# Patient Record
Sex: MALE | Race: WHITE | NOT HISPANIC OR LATINO | Employment: FULL TIME | URBAN - METROPOLITAN AREA
[De-identification: names, ages, dates, MRNs, and addresses within clinical notes are randomized per-mention and may not be internally consistent; named-entity substitution may affect disease eponyms.]

---

## 2020-11-19 ENCOUNTER — HOSPITAL ENCOUNTER (OUTPATIENT)
Facility: MEDICAL CENTER | Age: 35
End: 2020-11-19
Attending: PHYSICIAN ASSISTANT
Payer: COMMERCIAL

## 2020-11-19 ENCOUNTER — OFFICE VISIT (OUTPATIENT)
Dept: URGENT CARE | Facility: PHYSICIAN GROUP | Age: 35
End: 2020-11-19
Payer: COMMERCIAL

## 2020-11-19 ENCOUNTER — HOSPITAL ENCOUNTER (OUTPATIENT)
Dept: RADIOLOGY | Facility: MEDICAL CENTER | Age: 35
End: 2020-11-19
Attending: PHYSICIAN ASSISTANT
Payer: COMMERCIAL

## 2020-11-19 VITALS
SYSTOLIC BLOOD PRESSURE: 108 MMHG | WEIGHT: 277 LBS | HEART RATE: 71 BPM | TEMPERATURE: 99.1 F | RESPIRATION RATE: 30 BRPM | DIASTOLIC BLOOD PRESSURE: 72 MMHG | OXYGEN SATURATION: 95 %

## 2020-11-19 DIAGNOSIS — R06.82 TACHYPNEA: ICD-10-CM

## 2020-11-19 DIAGNOSIS — R19.7 DIARRHEA, UNSPECIFIED TYPE: ICD-10-CM

## 2020-11-19 DIAGNOSIS — Z20.822 SUSPECTED COVID-19 VIRUS INFECTION: ICD-10-CM

## 2020-11-19 DIAGNOSIS — R05.9 COUGH: ICD-10-CM

## 2020-11-19 DIAGNOSIS — E86.0 MILD DEHYDRATION: ICD-10-CM

## 2020-11-19 DIAGNOSIS — Z20.822 EXPOSURE TO COVID-19 VIRUS: ICD-10-CM

## 2020-11-19 DIAGNOSIS — R06.02 SHORTNESS OF BREATH: ICD-10-CM

## 2020-11-19 DIAGNOSIS — R11.0 NAUSEA: ICD-10-CM

## 2020-11-19 PROCEDURE — U0003 INFECTIOUS AGENT DETECTION BY NUCLEIC ACID (DNA OR RNA); SEVERE ACUTE RESPIRATORY SYNDROME CORONAVIRUS 2 (SARS-COV-2) (CORONAVIRUS DISEASE [COVID-19]), AMPLIFIED PROBE TECHNIQUE, MAKING USE OF HIGH THROUGHPUT TECHNOLOGIES AS DESCRIBED BY CMS-2020-01-R: HCPCS

## 2020-11-19 PROCEDURE — 99204 OFFICE O/P NEW MOD 45 MIN: CPT | Mod: CS,25 | Performed by: PHYSICIAN ASSISTANT

## 2020-11-19 PROCEDURE — 96372 THER/PROPH/DIAG INJ SC/IM: CPT | Performed by: PHYSICIAN ASSISTANT

## 2020-11-19 PROCEDURE — 71046 X-RAY EXAM CHEST 2 VIEWS: CPT

## 2020-11-19 RX ORDER — ONDANSETRON 4 MG/1
4 TABLET, ORALLY DISINTEGRATING ORAL EVERY 6 HOURS PRN
Qty: 10 TAB | Refills: 0 | Status: SHIPPED | OUTPATIENT
Start: 2020-11-19 | End: 2020-11-22

## 2020-11-19 RX ORDER — LATANOPROST 50 UG/ML
1 SOLUTION/ DROPS OPHTHALMIC NIGHTLY
COMMUNITY
End: 2022-05-06

## 2020-11-19 RX ORDER — ONDANSETRON 4 MG/1
4 TABLET, ORALLY DISINTEGRATING ORAL ONCE
Status: COMPLETED | OUTPATIENT
Start: 2020-11-19 | End: 2020-11-19

## 2020-11-19 RX ADMIN — ONDANSETRON 4 MG: 4 TABLET, ORALLY DISINTEGRATING ORAL at 13:56

## 2020-11-19 ASSESSMENT — ENCOUNTER SYMPTOMS
MYALGIAS: 1
SORE THROAT: 0
CHILLS: 1
DIARRHEA: 1
COUGH: 1
VOMITING: 0
HEADACHES: 1
SHORTNESS OF BREATH: 1
FEVER: 1
NAUSEA: 1

## 2020-11-19 NOTE — PATIENT INSTRUCTIONS
INSTRUCTIONS FOR COVID-19 OR ANY OTHER INFECTIOUS RESPIRATORY ILLNESSES    The Centers for Disease Control and Prevention (CDC) states that early indications for COVID-19 include cough, shortness of breath, difficulty breathing, or at least two of the following symptoms: chills, shaking with chills, muscle pain, headache, sore throat, and loss of taste or smell. Symptoms can range from mild to severe and may appear up to two weeks after exposure to the virus.    The practice of self-isolation and quarantine helps protect the public and your family by  preventing exposure to people who have or may have a contagious disease. Please follow the prevention steps below as based on CDC guidelines:    WHEN TO STOP ISOLATION: Persons with COVID-19 or any other infectious respiratory illness who have symptoms and were advised to care for themselves at home may discontinue home isolation under the following conditions:  · At least 24 hours have passed since recovery defined as resolution of fever without the use of fever-reducing medications; AND,  · Improvement in respiratory symptoms (e.g., cough, shortness of breath); AND,  · At least 10 days have passed since symptoms first appeared and have had no subsequent illness.    MONITOR YOUR SYMPTOMS: If your illness is worsening, seek prompt medical attention. If you have a medical emergency and need to call 911, notify the dispatch personnel that you have, or are being evaluated for confirmed or suspected COVID-19 or another infectious respiratory illness. Wear a facemask if possible.    ACTIVITY RESTRICTION: restrict activities outside your home, except for getting medical care. Do not go to work, school, or public areas. Avoid using public transportation, ride-sharing, or taxis.    SCHEDULED MEDICAL APPOINTMENTS: Notify your provider that you have, or are being evaluated for, confirmed or suspected COVID-19 or another infectious respiratory. This will help the healthcare  provider’s office safely take care of you and keep other people from getting exposed or infected.    FACEMASKS, when to wear: Anytime you are away from your home or around other people or pets. If you are unable to wear one, maintain a minimum of 6 feet distancing from others.    LIVING ENVIRONMENT: Stay in a separate room from other people and pets. If possible, use a separate bathroom, have someone else care for your pets and avoid sharing household items. Any items used should be washed thoroughly with soap and water. Clean all “high-touch” surfaces every day. Use a household cleaning spray or wipe, according to the label instructions. High touch surfaces include (but are not limited to) counters, tabletops, doorknobs, bathroom fixtures, toilets, phones, keyboards, tablets, and bedside tables.     HAND WASHING: Frequently wash hands with soap and water for at least 20 seconds,  especially after blowing your nose, coughing, or sneezing; going to the bathroom; before and after interacting with pets; and before and after eating or preparing food. If hands are visibly dirty use soap and water. If soap and water are not available, use an alcohol-based hand  with at least 60% alcohol. Avoid touching your eyes, nose, and mouth with unwashed hands. Cover your coughs and sneezes with a tissue. Throw used tissues in a lined trash can. Immediately wash your hands.    ACTIVE/FACILITATED SELF-MONITORING: Follow instructions provided by your local health department or health professionals, as appropriate. When working with your local health department check their available hours.    [facility]   Phone Number   [facility] [phone]   [facility] [place] [phone]   [place] Call [address]   [place] [phone]     IF YOU HAVE CONFIRMED POSITIVE COVID-19:    Those who have completely recovered from COVID-19 may have immune-boosting antibodies in their plasma--called “convalescent plasma”--that could be  used to treat critically ill COVID19 patients.    [facility] is excited to begin working with [facility] on collecting convalescent plasma from  people who have recovered from COVID-19 as part of a program to treat patients infected with the virus. This FDA-approved “emergency investigational new drug” is a special blood product containing antibodies that may give patients an extra boost to fight the virus.    To be eligible to donate convalescent plasma, you must have a prior COVID-19 diagnosis documented by a laboratory test (or a positive test result for SARS-CoV-2 antibodies) and meet additional eligibility requirements.    If you are interested in donating convalescent plasma or have any additional questions, please contact the [facility] Convalescent Plasma  at [phone] or via e-mail at [email or web address].

## 2020-11-19 NOTE — LETTER
November 19, 2020         Patient: Hayden Wilson   YOB: 1985   Date of Visit: 11/19/2020           To Whom it May Concern,     A concern for COVID-19 illness has been identified and testing is in progress. The results are available through our electronic delivery system called [employer].      We are asking employers to excuse absences while they follow self-isolation protocol per CDC guidelines:      • At least 10 days since symptoms first appeared and    • At least 24 hours with no fever greater than 100.4 F without fever-reducing medication and    • Symptoms have improved     If results are negative you must continue to follow the self-isolation protocol.        If the results of testing are positive then you will be contacted by your [facility] department for further instructions on duration of self-isolation and return to work.  In general, this will also follow the CDC guidelines with a minimum of 10 days from the onset of symptoms and symptoms are improving without fever.      This is the only note that will be provided from [facility] for this visit. Please schedule a visit with a primary care provider if documents for FMLA, disability, or unemployment are required.   Sincerely,?     Yuni Salgado P.A.-C.  Electronically Signed

## 2020-11-19 NOTE — LETTER
November 19, 2020         Patient: Hayden Wilson   YOB: 1985   Date of Visit: 11/19/2020       To Whom it May Concern,     A concern for COVID-19 illness has been identified and testing is in progress. The results are available through our electronic delivery system called [employer].      We are asking employers to excuse absences while they follow self-isolation protocol per CDC guidelines:      • At least 10 days since symptoms first appeared and    • At least 24 hours with no fever greater than 100.4 F without fever-reducing medication and    • Symptoms have improved     If results are negative you must continue to follow the self-isolation protocol.        If the results of testing are positive then you will be contacted by your [facility] department for further instructions on duration of self-isolation and return to work.  In general, this will also follow the CDC guidelines with a minimum of 10 days from the onset of symptoms and symptoms are improving without fever.      This is the only note that will be provided from [facility] for this visit. Please schedule a visit with a primary care provider if documents for FMLA, disability, or unemployment are required.   Sincerely,?     Yuni Salgado P.A.-C.  Electronically Signed

## 2020-11-19 NOTE — LETTER
November 19, 2020         Patient: Hayden Wilson   YOB: 1985   Date of Visit: 11/19/2020       To Whom it May Concern,     A concern for COVID-19 illness has been identified and testing is in progress. The results are available through our electronic delivery system called [employer].      We are asking employers to excuse absences while they follow self-isolation protocol per CDC guidelines:      • At least 10 days since symptoms first appeared and    • At least 24 hours with no fever greater than 100.4 F without fever-reducing medication and    • Symptoms have improved     If results are negative you must continue to follow the self-isolation protocol.        If the results of testing are positive then you will be contacted by your [facility] department for further instructions on duration of self-isolation and return to work.  In general, this will also follow the CDC guidelines with a minimum of 10 days from the onset of symptoms and symptoms are improving without fever.      This is the only note that will be provided from [facility] for this visit. Please schedule a visit with a primary care provider if documents for FMLA, disability, or unemployment are required.   Sincerely,?     Yuni Salgado P.A.-C.  Electronically Signed

## 2020-11-19 NOTE — PROGRESS NOTES
Subjective:     Hayden Wilson  is a 35 y.o. male who presents for Nausea (achy,diarrhea,headache,x 2 days,mom positive for covid 2 days ago)    This is a new problem.  Patient presents to urgent care with 2-day history of fever to 103, chills, generalized body aches, fatigue, headache, cough, mild shortness of breath.  Patient also reports nausea with no vomiting today and approximately 5 loose watery stools since this morning.  Patient denies hematochezia or melena.  Patient's been taking ibuprofen for fever which he does find somewhat helpful.  Patient's mother tested positive for COVID-19 several days ago.  Patient lives in the same household with his mother.    Patient reports that he is struggling with diarrhea and that every time he eats or drinks anything it tends to cause immediate diarrhea.  Last void, while in clinic.    Patient is a former smoker.  He denies any history of asthma or other underlying lung disease.     Nausea  Associated symptoms include chills, congestion, coughing, a fever, headaches, myalgias and nausea. Pertinent negatives include no sore throat or vomiting.         Review of Systems   Constitutional: Positive for chills, fever and malaise/fatigue.   HENT: Positive for congestion. Negative for sore throat.    Respiratory: Positive for cough and shortness of breath.    Gastrointestinal: Positive for diarrhea and nausea. Negative for vomiting.   Musculoskeletal: Positive for myalgias.   Neurological: Positive for headaches.   All other systems reviewed and are negative.    Allergies   Allergen Reactions   • Erythromycin    • Pcn [Penicillins]      Past medical history, family history, surgical history and social history are reviewed and updated in the record today.     Objective:   /72 (BP Location: Right arm, Patient Position: Sitting, BP Cuff Size: Large adult)   Pulse 71   Temp 37.3 °C (99.1 °F) (Temporal)   Resp (!) 30   Wt (!) 125.6 kg (277 lb)   SpO2 95%   Physical  Exam  Vitals signs reviewed.   Constitutional:       Appearance: He is well-developed. He is not ill-appearing or toxic-appearing.   HENT:      Head: Normocephalic and atraumatic.      Right Ear: Tympanic membrane, ear canal and external ear normal.      Left Ear: Tympanic membrane, ear canal and external ear normal.      Nose: Nose normal.      Mouth/Throat:      Lips: Pink.      Mouth: Mucous membranes are dry.      Pharynx: Uvula midline. No oropharyngeal exudate or posterior oropharyngeal erythema.   Eyes:      Conjunctiva/sclera: Conjunctivae normal.      Pupils: Pupils are equal, round, and reactive to light.   Neck:      Musculoskeletal: Normal range of motion and neck supple.   Cardiovascular:      Rate and Rhythm: Normal rate and regular rhythm.      Heart sounds: Normal heart sounds. No murmur. No friction rub.   Pulmonary:      Effort: Pulmonary effort is normal. Tachypnea present. No respiratory distress.      Breath sounds: Examination of the left-middle field reveals decreased breath sounds. Examination of the right-lower field reveals decreased breath sounds. Examination of the left-lower field reveals decreased breath sounds. Decreased breath sounds present. No wheezing, rhonchi or rales.   Abdominal:      General: Bowel sounds are normal.      Palpations: Abdomen is soft.      Tenderness: There is no abdominal tenderness.   Musculoskeletal: Normal range of motion.   Lymphadenopathy:      Head:      Right side of head: No submental, submandibular or tonsillar adenopathy.      Left side of head: No submental, submandibular or tonsillar adenopathy.      Cervical: No cervical adenopathy.      Upper Body:      Right upper body: No supraclavicular adenopathy.      Left upper body: No supraclavicular adenopathy.   Skin:     General: Skin is warm and dry.      Findings: No rash.   Neurological:      Mental Status: He is alert and oriented to person, place, and time.      Cranial Nerves: Cranial nerves are  intact. No cranial nerve deficit.      Sensory: Sensation is intact. No sensory deficit.      Motor: Motor function is intact.      Coordination: Coordination is intact. Coordination normal.      Gait: Gait is intact.   Psychiatric:         Attention and Perception: Attention normal.         Mood and Affect: Mood normal.         Speech: Speech normal.         Behavior: Behavior normal.         Thought Content: Thought content normal.         Judgment: Judgment normal.          Assessment/Plan:   1. Cough  - DX-CHEST-2 VIEWS; Future  - COVID/SARS COV-2 PCR; Future    2. Shortness of breath  - DX-CHEST-2 VIEWS; Future  - COVID/SARS COV-2 PCR; Future    3. Tachypnea  - DX-CHEST-2 VIEWS; Future  - COVID/SARS COV-2 PCR; Future    4. Suspected COVID-19 virus infection  - DX-CHEST-2 VIEWS; Future  - COVID/SARS COV-2 PCR; Future    5. Exposure to COVID-19 virus  - DX-CHEST-2 VIEWS; Future  - COVID/SARS COV-2 PCR; Future    6. Mild dehydration  - COVID/SARS COV-2 PCR; Future    7. Diarrhea, unspecified type  - COVID/SARS COV-2 PCR; Future    8. Nausea  - COVID/SARS COV-2 PCR; Future  - ondansetron (ZOFRAN ODT) dispertab 4 mg  - ondansetron (ZOFRAN ODT) 4 MG TABLET DISPERSIBLE; Take 1 Tab by mouth every 6 hours as needed for Nausea for up to 3 days.  Dispense: 10 Tab; Refill: 0      Chest x-ray is obtained, read by radiologist and reviewed by myself without acute cardiopulmonary process.      Patient has mild tachypnea on intake with respiratory rate of 24.  Reassessment of respiratory rate by provider reveals respirations of 30.  Patient is challenged with a 1 minute walk and is able to maintain O2 saturation greater than 94% for the entire time and 1 minute following activity.    Testing performed for COVID-19.  Patient/guardian is given printed instructions regarding self-isolation.  Work/school note is provided with specific return to work/school protocols.  Reviewed with patient/guardian that if they do test positive  they will be contacted by their local health department regarding return to work/school protocols.  Results will also be released to patient/guardian in [facility] and call to the patient/guardian directly.  Encouraged mask use, frequent handwashing, wiping down hard surfaces, etc.    Mucous hemorrhoids are somewhat dry here in clinic.  Patient is not tachycardic and blood pressure is acceptable.  Patient is given Zofran in clinic for nausea and additional Zofran for home.  He is counseled on the importance of oral rehydration.  Pedialyte, BRAT diet with advance as tolerated.  Signs and symptoms of dehydration reviewed.  Patient is counseled on presenting to the emergency department immediately for worsening signs of dehydration, weakness, dizziness, worsening shortness of breath, chest pain, etc.    Nasal saline rinse  Over-the-counter Flonase nasal spray per 's instructions  Mucinex as needed    May use Tylenol or ibuprofen over-the-counter as needed for pain or fever not to exceed recommended daily dose.    Upon entering exam room I ensured patient was wearing a mask.  This provider wore appropriate PPE throughout entire visit.  Patient wore mask entire visit except for a brief period while examining oropharynx.    Differential diagnosis, natural history, supportive care, and indications for immediate follow-up discussed.  Red flag warning symptoms and strict ER/follow-up precautions given.  The patient demonstrated a good understanding and agreed with the treatment plan.  Please note that this note was created using voice recognition speech to text software. Every effort has been made to correct obvious errors.  However, I expect there are errors of grammar and possibly context that were not discovered prior to finalizing the note  FRANCISCO Salgado PA-C

## 2020-11-20 DIAGNOSIS — R06.02 SHORTNESS OF BREATH: ICD-10-CM

## 2020-11-20 DIAGNOSIS — R05.9 COUGH: ICD-10-CM

## 2020-11-20 DIAGNOSIS — R06.82 TACHYPNEA: ICD-10-CM

## 2020-11-20 DIAGNOSIS — E86.0 MILD DEHYDRATION: ICD-10-CM

## 2020-11-20 DIAGNOSIS — Z20.822 SUSPECTED COVID-19 VIRUS INFECTION: ICD-10-CM

## 2020-11-20 DIAGNOSIS — R19.7 DIARRHEA, UNSPECIFIED TYPE: ICD-10-CM

## 2020-11-20 DIAGNOSIS — R11.0 NAUSEA: ICD-10-CM

## 2020-11-20 DIAGNOSIS — Z20.822 EXPOSURE TO COVID-19 VIRUS: ICD-10-CM

## 2020-11-20 LAB
COVID ORDER STATUS COVID19: NORMAL
SARS-COV-2 RNA RESP QL NAA+PROBE: DETECTED
SPECIMEN SOURCE: ABNORMAL

## 2021-07-18 ENCOUNTER — OFFICE VISIT (OUTPATIENT)
Dept: URGENT CARE | Facility: PHYSICIAN GROUP | Age: 36
End: 2021-07-18

## 2021-07-18 ENCOUNTER — HOSPITAL ENCOUNTER (OUTPATIENT)
Dept: RADIOLOGY | Facility: MEDICAL CENTER | Age: 36
End: 2021-07-18
Attending: NURSE PRACTITIONER

## 2021-07-18 VITALS
OXYGEN SATURATION: 98 % | BODY MASS INDEX: 41.09 KG/M2 | HEIGHT: 70 IN | HEART RATE: 79 BPM | SYSTOLIC BLOOD PRESSURE: 118 MMHG | RESPIRATION RATE: 20 BRPM | DIASTOLIC BLOOD PRESSURE: 84 MMHG | WEIGHT: 287 LBS | TEMPERATURE: 98.1 F

## 2021-07-18 DIAGNOSIS — R19.03 RIGHT LOWER QUADRANT ABDOMINAL SWELLING, MASS AND LUMP: ICD-10-CM

## 2021-07-18 DIAGNOSIS — Z00.00 HEALTH CARE MAINTENANCE: ICD-10-CM

## 2021-07-18 PROCEDURE — 99213 OFFICE O/P EST LOW 20 MIN: CPT | Performed by: NURSE PRACTITIONER

## 2021-07-18 PROCEDURE — 74018 RADEX ABDOMEN 1 VIEW: CPT

## 2021-07-18 ASSESSMENT — ENCOUNTER SYMPTOMS
CONSTIPATION: 0
SENSORY CHANGE: 1
CHILLS: 0
TINGLING: 1
VOMITING: 0
NAUSEA: 0
DIARRHEA: 0
ABDOMINAL PAIN: 0
FEVER: 0
MYALGIAS: 0

## 2021-07-18 NOTE — PROGRESS NOTES
Subjective:     Hayden Wilson is a 36 y.o. male who presents for Bump (right side on hip x 3 days numbness and tingling on hands and feet)      HPI  Pt presents for evaluation of a new problem.  Hayden is a pleasant 36-year-old male presents to urgent care today with complaints of right sided abdominal swelling x1 day.  He denies any pain associated with the swelling.  He is negative for constipation, fever, chills, nausea or vomiting.  2 days ago he did have one episode of vomiting followed by an episode of diarrhea.  This did resolve without intervention.  This morning, he developed right hand numbness/tingling as well as right foot numbness/tingling.  He does suffer from chronic lower back pain on the right side.  He denies any new or worsening back pain.     Review of Systems   Constitutional: Negative for chills, fever and malaise/fatigue.   Gastrointestinal: Negative for abdominal pain, constipation, diarrhea, nausea and vomiting.   Genitourinary: Negative for dysuria, frequency and urgency.   Musculoskeletal: Negative for myalgias.   Neurological: Positive for tingling and sensory change.       PMH: No past medical history on file.  ALLERGIES:   Allergies   Allergen Reactions   • Erythromycin    • Pcn [Penicillins]      SURGHX: No past surgical history on file.  SOCHX:   Social History     Socioeconomic History   • Marital status: Single     Spouse name: Not on file   • Number of children: Not on file   • Years of education: Not on file   • Highest education level: Not on file   Occupational History   • Not on file   Tobacco Use   • Smoking status: Former Smoker   • Smokeless tobacco: Never Used   Vaping Use   • Vaping Use: Former   Substance and Sexual Activity   • Alcohol use: Not on file   • Drug use: Not on file   • Sexual activity: Not on file   Other Topics Concern   • Not on file   Social History Narrative   • Not on file     Social Determinants of Health     Financial Resource Strain:    • Difficulty  "of Paying Living Expenses:    Food Insecurity:    • Worried About Running Out of Food in the Last Year:    • Ran Out of Food in the Last Year:    Transportation Needs:    • Lack of Transportation (Medical):    • Lack of Transportation (Non-Medical):    Physical Activity:    • Days of Exercise per Week:    • Minutes of Exercise per Session:    Stress:    • Feeling of Stress :    Social Connections:    • Frequency of Communication with Friends and Family:    • Frequency of Social Gatherings with Friends and Family:    • Attends Baptism Services:    • Active Member of Clubs or Organizations:    • Attends Club or Organization Meetings:    • Marital Status:    Intimate Partner Violence:    • Fear of Current or Ex-Partner:    • Emotionally Abused:    • Physically Abused:    • Sexually Abused:      FH:   Family History   Problem Relation Age of Onset   • Hyperlipidemia Mother    • Hypertension Father          Objective:   /84 (BP Location: Left arm, Patient Position: Sitting, BP Cuff Size: Adult)   Pulse 79   Temp 36.7 °C (98.1 °F) (Temporal)   Resp 20   Ht 1.765 m (5' 9.5\")   Wt (!) 130 kg (287 lb)   SpO2 98%   BMI 41.77 kg/m²     Physical Exam  Vitals and nursing note reviewed.   Constitutional:       General: He is not in acute distress.     Appearance: Normal appearance. He is not ill-appearing.   HENT:      Head: Normocephalic and atraumatic.      Right Ear: External ear normal.      Left Ear: External ear normal.      Nose: No congestion or rhinorrhea.      Mouth/Throat:      Mouth: Mucous membranes are moist.   Eyes:      Extraocular Movements: Extraocular movements intact.      Pupils: Pupils are equal, round, and reactive to light.   Cardiovascular:      Rate and Rhythm: Normal rate and regular rhythm.      Pulses: Normal pulses.      Heart sounds: Normal heart sounds.   Pulmonary:      Effort: Pulmonary effort is normal.      Breath sounds: Normal breath sounds.   Abdominal:      General: Abdomen " is flat. Bowel sounds are normal. There is distension.      Palpations: Abdomen is soft. There is no mass.      Tenderness: There is no abdominal tenderness. There is no right CVA tenderness, left CVA tenderness, guarding or rebound.      Hernia: No hernia is present.          Comments: There is visible swelling of his right side of abdomen from anterior and posterior view.  Negative for erythema, mass or pain with palpation.   Musculoskeletal:         General: Normal range of motion.      Cervical back: Normal range of motion and neck supple.   Skin:     General: Skin is warm and dry.      Capillary Refill: Capillary refill takes less than 2 seconds.   Neurological:      General: No focal deficit present.      Mental Status: He is alert and oriented to person, place, and time. Mental status is at baseline.   Psychiatric:         Mood and Affect: Mood normal.         Behavior: Behavior normal.         Thought Content: Thought content normal.         Judgment: Judgment normal.       DX abdomen:FINDINGS:  There is no evidence of bowel obstruction.  There is no evidence of free intraperitoneal air.  There are pelvic phleboliths.     IMPRESSION:     No evidence of bowel obstruction.  Assessment/Plan:   Assessment    1. Right lower quadrant abdominal swelling, mass and lump  [id number] VIEW   2. [facility] care maintenance  AMB REFERRAL TO ESTABLISH WITH [facility]     We discussed differential diagnoses.  X-ray was reviewed by myself and I do agree with radiologist interpretation.  Patient notified of x-ray findings.  He is currently uninsured and does not wish to have further diagnostic tests.  He is in no apparent distress or pain at visit today.  He is in the process of getting insurance so he was referred to follow-up with primary care.  Red flags discussed when he should seek care back in urgent care or ER.  He verbalized understanding.

## 2021-07-21 ENCOUNTER — TELEPHONE (OUTPATIENT)
Dept: SCHEDULING | Facility: IMAGING CENTER | Age: 36
End: 2021-07-21

## 2021-09-08 ENCOUNTER — HOSPITAL ENCOUNTER (OUTPATIENT)
Dept: LAB | Facility: MEDICAL CENTER | Age: 36
End: 2021-09-08
Attending: PHYSICIAN ASSISTANT
Payer: COMMERCIAL

## 2021-09-08 ENCOUNTER — OFFICE VISIT (OUTPATIENT)
Dept: MEDICAL GROUP | Facility: PHYSICIAN GROUP | Age: 36
End: 2021-09-08
Payer: COMMERCIAL

## 2021-09-08 VITALS
RESPIRATION RATE: 14 BRPM | HEART RATE: 65 BPM | SYSTOLIC BLOOD PRESSURE: 118 MMHG | DIASTOLIC BLOOD PRESSURE: 78 MMHG | HEIGHT: 70 IN | WEIGHT: 291 LBS | OXYGEN SATURATION: 99 % | BODY MASS INDEX: 41.66 KG/M2 | TEMPERATURE: 97.2 F

## 2021-09-08 DIAGNOSIS — Z13.29 SCREENING FOR ENDOCRINE DISORDER: ICD-10-CM

## 2021-09-08 DIAGNOSIS — H40.9 GLAUCOMA, UNSPECIFIED GLAUCOMA TYPE, UNSPECIFIED LATERALITY: ICD-10-CM

## 2021-09-08 DIAGNOSIS — R10.31 RIGHT LOWER QUADRANT ABDOMINAL PAIN: ICD-10-CM

## 2021-09-08 DIAGNOSIS — Z00.00 GENERAL MEDICAL EXAM: ICD-10-CM

## 2021-09-08 DIAGNOSIS — M54.50 CHRONIC RIGHT-SIDED LOW BACK PAIN WITHOUT SCIATICA: ICD-10-CM

## 2021-09-08 DIAGNOSIS — G89.29 CHRONIC RIGHT-SIDED LOW BACK PAIN WITHOUT SCIATICA: ICD-10-CM

## 2021-09-08 PROBLEM — R10.9 ABDOMINAL PAIN: Status: ACTIVE | Noted: 2021-09-08

## 2021-09-08 LAB
25(OH)D3 SERPL-MCNC: 21 NG/ML (ref 30–100)
ALBUMIN SERPL BCP-MCNC: 4.3 G/DL (ref 3.2–4.9)
ALBUMIN/GLOB SERPL: 1.4 G/DL
ALP SERPL-CCNC: 70 U/L (ref 30–99)
ALT SERPL-CCNC: 32 U/L (ref 2–50)
ANION GAP SERPL CALC-SCNC: 10 MMOL/L (ref 7–16)
APPEARANCE UR: CLEAR
AST SERPL-CCNC: 35 U/L (ref 12–45)
BASOPHILS # BLD AUTO: 0.5 % (ref 0–1.8)
BASOPHILS # BLD: 0.03 K/UL (ref 0–0.12)
BILIRUB SERPL-MCNC: 0.6 MG/DL (ref 0.1–1.5)
BILIRUB UR QL STRIP.AUTO: NEGATIVE
BUN SERPL-MCNC: 13 MG/DL (ref 8–22)
CALCIUM SERPL-MCNC: 9.2 MG/DL (ref 8.5–10.5)
CHLORIDE SERPL-SCNC: 105 MMOL/L (ref 96–112)
CHOLEST SERPL-MCNC: 181 MG/DL (ref 100–199)
CO2 SERPL-SCNC: 26 MMOL/L (ref 20–33)
COLOR UR: YELLOW
CREAT SERPL-MCNC: 0.82 MG/DL (ref 0.5–1.4)
EOSINOPHIL # BLD AUTO: 0.1 K/UL (ref 0–0.51)
EOSINOPHIL NFR BLD: 1.7 % (ref 0–6.9)
ERYTHROCYTE [DISTWIDTH] IN BLOOD BY AUTOMATED COUNT: 48.4 FL (ref 35.9–50)
FASTING STATUS PATIENT QL REPORTED: NORMAL
GLOBULIN SER CALC-MCNC: 3 G/DL (ref 1.9–3.5)
GLUCOSE SERPL-MCNC: 90 MG/DL (ref 65–99)
GLUCOSE UR STRIP.AUTO-MCNC: NEGATIVE MG/DL
HCT VFR BLD AUTO: 44.7 % (ref 42–52)
HDLC SERPL-MCNC: 35 MG/DL
HGB BLD-MCNC: 15 G/DL (ref 14–18)
IMM GRANULOCYTES # BLD AUTO: 0.03 K/UL (ref 0–0.11)
IMM GRANULOCYTES NFR BLD AUTO: 0.5 % (ref 0–0.9)
KETONES UR STRIP.AUTO-MCNC: NEGATIVE MG/DL
LDLC SERPL CALC-MCNC: 128 MG/DL
LEUKOCYTE ESTERASE UR QL STRIP.AUTO: NEGATIVE
LYMPHOCYTES # BLD AUTO: 1.83 K/UL (ref 1–4.8)
LYMPHOCYTES NFR BLD: 30.6 % (ref 22–41)
MCH RBC QN AUTO: 30.7 PG (ref 27–33)
MCHC RBC AUTO-ENTMCNC: 33.6 G/DL (ref 33.7–35.3)
MCV RBC AUTO: 91.6 FL (ref 81.4–97.8)
MICRO URNS: NORMAL
MONOCYTES # BLD AUTO: 0.59 K/UL (ref 0–0.85)
MONOCYTES NFR BLD AUTO: 9.8 % (ref 0–13.4)
NEUTROPHILS # BLD AUTO: 3.41 K/UL (ref 1.82–7.42)
NEUTROPHILS NFR BLD: 56.9 % (ref 44–72)
NITRITE UR QL STRIP.AUTO: NEGATIVE
NRBC # BLD AUTO: 0 K/UL
NRBC BLD-RTO: 0 /100 WBC
PH UR STRIP.AUTO: 6 [PH] (ref 5–8)
PLATELET # BLD AUTO: 336 K/UL (ref 164–446)
PMV BLD AUTO: 9 FL (ref 9–12.9)
POTASSIUM SERPL-SCNC: 3.9 MMOL/L (ref 3.6–5.5)
PROT SERPL-MCNC: 7.3 G/DL (ref 6–8.2)
PROT UR QL STRIP: NEGATIVE MG/DL
RBC # BLD AUTO: 4.88 M/UL (ref 4.7–6.1)
RBC UR QL AUTO: NEGATIVE
SODIUM SERPL-SCNC: 141 MMOL/L (ref 135–145)
SP GR UR STRIP.AUTO: 1.02
TRIGL SERPL-MCNC: 89 MG/DL (ref 0–149)
TSH SERPL DL<=0.005 MIU/L-ACNC: 0.65 UIU/ML (ref 0.38–5.33)
UROBILINOGEN UR STRIP.AUTO-MCNC: 0.2 MG/DL
WBC # BLD AUTO: 6 K/UL (ref 4.8–10.8)

## 2021-09-08 PROCEDURE — 99213 OFFICE O/P EST LOW 20 MIN: CPT | Performed by: PHYSICIAN ASSISTANT

## 2021-09-08 PROCEDURE — 84443 ASSAY THYROID STIM HORMONE: CPT

## 2021-09-08 PROCEDURE — 81003 URINALYSIS AUTO W/O SCOPE: CPT

## 2021-09-08 PROCEDURE — 85025 COMPLETE CBC W/AUTO DIFF WBC: CPT

## 2021-09-08 PROCEDURE — 82306 VITAMIN D 25 HYDROXY: CPT

## 2021-09-08 PROCEDURE — 80053 COMPREHEN METABOLIC PANEL: CPT

## 2021-09-08 PROCEDURE — 36415 COLL VENOUS BLD VENIPUNCTURE: CPT

## 2021-09-08 PROCEDURE — 80061 LIPID PANEL: CPT

## 2021-09-08 ASSESSMENT — PATIENT HEALTH QUESTIONNAIRE - PHQ9: CLINICAL INTERPRETATION OF PHQ2 SCORE: 0

## 2021-09-08 NOTE — ASSESSMENT & PLAN NOTE
Right flank/abdominal pain. Has been this way since July 18th. Went to - Abdominal xray was negative. The day before onset he was Nauseous with vomiting and chills. No recurrent symptoms since. No fever or chills. No urinary symptoms. No hematuria, no urinary urgency or frequency. No constipation or diarrhea.   Aggravated with strain. States will swell at times.   Patient takes 6 Advil daily x 2 months for sore joints after work.

## 2021-09-08 NOTE — PROGRESS NOTES
CC:   Chief Complaint   Patient presents with   • Establish Care   • Abdominal Pain          HISTORY OF PRESENT ILLNESS: Patient is a 36 y.o. male established patient who presents today to establish care with me and discuss the following issues:      Health Maintenance: Completed    Abdominal pain  Right flank/abdominal pain. Has been this way since . Went to - Abdominal xray was negative. The day before onset he was Nauseous with vomiting and chills. No recurrent symptoms since. No fever or chills. No urinary symptoms. No hematuria, no urinary urgency or frequency. No constipation or diarrhea.   Aggravated with strain. States will swell at times.   Patient takes 6 Advil daily x 2 months for sore joints after work.     Glaucoma  Dr. Chavira. On latanoprost eye drops. Was diagnosed 2 years ago.       Patient Active Problem List    Diagnosis Date Noted   • Abdominal pain 2021   • Glaucoma 2021      Allergies:Erythromycin and Pcn [penicillins]    Current Outpatient Medications   Medication Sig Dispense Refill   • latanoprost (XALATAN) 0.005 % Solution Administer 1 Drop into both eyes every evening.       No current facility-administered medications for this visit.       Social History     Tobacco Use   • Smoking status: Former Smoker     Types: Cigarettes     Quit date:      Years since quittin.6   • Smokeless tobacco: Never Used   Vaping Use   • Vaping Use: Former   Substance Use Topics   • Alcohol use: Not Currently   • Drug use: Never     Social History     Social History Narrative   • Not on file       Family History   Problem Relation Age of Onset   • Hyperlipidemia Mother    • Hypertension Father    • Diabetes Father        Review of Systems:    Constitutional: No Fevers, Chills  Eyes: No vision changes  ENT: No hearing changes  Resp: No Shortness of breath  CV: No Chest pain  GI: No Nausea/Vomiting  MSK: No weakness  Skin: No rashes  Neuro: No Headaches  Psych: No Suicidal  "ideations    All remaining systems reviewed and found to be negative, except as stated above.    Exam:    /78   Pulse 65   Temp 36.2 °C (97.2 °F) (Temporal)   Resp 14   Ht 1.765 m (5' 9.5\")   Wt (!) 132 kg (291 lb)   SpO2 99%  Body mass index is 42.36 kg/m².    General:  Well nourished, well developed male in NAD  HENT: Atraumatic, normocephalic  EYES: Extraocular movements intact  NECK: Supple, FROM  CHEST: No deformities, Equal chest expansion  RESP: Unlabored, no stridor or audible wheeze  HEART: Regular Rate and rhythm.   ABD: Soft, Nontender, Non-Distended  Right lower  to palpation just above ilium. No bulge palpated. No CVA tenderness bilaterally. No hip tenderness. Normal gait. Lumbar spine nontender midline and paraspinous region.   Extremities: No Clubbing, Cyanosis, or Edema  Skin: Warm/dry, without rashes  Neuro: A/O x 4, due to COVID-19- did not have patient remove face mask to test cranial nerves.  Motor/sensory grossly intact  Psych: Normal behavior, normal affect      Assessment/Plan:  1. Right lower quadrant abdominal pain  - URINALYSIS,CULTURE IF INDICATED; Future  2. Chronic right-sided low back pain without sciatica  - REFERRAL TO PHYSICAL THERAPY  Suspect musculoskeletal in nature.  Possible muscular strain over the right lower back.  He does have a new exertional job that he has been out for about 3 months.  Going to proceed with baseline labs, hold on imaging.  I do believe he benefit from PT at this time.  3. General medical exam  - CBC WITH DIFFERENTIAL; Future  - Comp Metabolic Panel; Future  - Lipid Profile; Future  - TSH; Future    4. Screening for endocrine disorder  - VITAMIN D,25 HYDROXY; Future    5. Glaucoma, unspecified glaucoma type, unspecified laterality  Follow-up with eye specialist as scheduled.    Follow-up: Return in about 4 weeks (around 10/6/2021) for Follow up on labs.    Please note that this dictation was created using voice recognition software. " I have made every reasonable attempt to correct obvious errors, but I expect that there are errors of grammar and possibly content that I did not discover before finalizing the note.

## 2021-09-09 ENCOUNTER — TELEPHONE (OUTPATIENT)
Dept: MEDICAL GROUP | Facility: PHYSICIAN GROUP | Age: 36
End: 2021-09-09

## 2021-09-09 NOTE — TELEPHONE ENCOUNTER
----- Message from Janis Fall P.A.-C. sent at 9/8/2021  3:31 PM PDT -----  Please call patient about their results.     Results showed:     Your cholesterol is elevated. I recommend decreasing your intake of saturated fats which are found in meats that come from a cow or pig. Saturated fats are also found in creams, cheeses, butter, mayonnaise, and fried foods. I recommend that you try to eat more vegetables, fruits, fish, and healthy oils like olive oil. Increase fiber intake to 35 grams or more a day. If you do not eat a lot of fiber currently, increase fiber slowly over weeks to reduce bloating and abdominal discomfort. I also recommend moderate intensity exercise like a brisk walk. This exercise should last at least 30 minutes and occur 5 or more days per week.       Vitamin D is low.  Please start supplementing vitamin D 2000 IUs daily.    Thank you,    Janis Fall PA-C

## 2021-10-07 ENCOUNTER — TELEPHONE (OUTPATIENT)
Dept: MEDICAL GROUP | Facility: PHYSICIAN GROUP | Age: 36
End: 2021-10-07

## 2021-10-07 ENCOUNTER — OFFICE VISIT (OUTPATIENT)
Dept: MEDICAL GROUP | Facility: PHYSICIAN GROUP | Age: 36
End: 2021-10-07
Payer: COMMERCIAL

## 2021-10-07 ENCOUNTER — HOSPITAL ENCOUNTER (OUTPATIENT)
Dept: RADIOLOGY | Facility: MEDICAL CENTER | Age: 36
End: 2021-10-07
Attending: PHYSICIAN ASSISTANT
Payer: COMMERCIAL

## 2021-10-07 VITALS
HEART RATE: 78 BPM | TEMPERATURE: 98.9 F | RESPIRATION RATE: 20 BRPM | OXYGEN SATURATION: 98 % | DIASTOLIC BLOOD PRESSURE: 82 MMHG | WEIGHT: 295 LBS | SYSTOLIC BLOOD PRESSURE: 130 MMHG | BODY MASS INDEX: 43.69 KG/M2 | HEIGHT: 69 IN

## 2021-10-07 DIAGNOSIS — G89.29 CHRONIC RIGHT SHOULDER PAIN: ICD-10-CM

## 2021-10-07 DIAGNOSIS — M25.511 CHRONIC RIGHT SHOULDER PAIN: ICD-10-CM

## 2021-10-07 DIAGNOSIS — E55.9 VITAMIN D DEFICIENCY: ICD-10-CM

## 2021-10-07 DIAGNOSIS — E78.5 DYSLIPIDEMIA: ICD-10-CM

## 2021-10-07 PROCEDURE — 73030 X-RAY EXAM OF SHOULDER: CPT | Mod: RT

## 2021-10-07 PROCEDURE — 99214 OFFICE O/P EST MOD 30 MIN: CPT | Performed by: PHYSICIAN ASSISTANT

## 2021-10-07 ASSESSMENT — FIBROSIS 4 INDEX: FIB4 SCORE: 0.7

## 2021-10-07 NOTE — ASSESSMENT & PLAN NOTE
This is a chronic condition.   Latest Labs:   Lab Results   Component Value Date/Time    CHOLSTRLTOT 181 09/08/2021 10:30 AM     (H) 09/08/2021 10:30 AM    HDL 35 (A) 09/08/2021 10:30 AM    TRIGLYCERIDE 89 09/08/2021 10:30 AM    Discussed lifestyle modifications. At work he is very active. He could use improvement with diet.

## 2021-10-07 NOTE — ASSESSMENT & PLAN NOTE
"Injury as a child- threw a \"shot-put like a baseball\".   Saw PT, they evaluated him for this.   At work lifting more than usual, this aggravates it. Taking Advil for it, helps a little bit. Some radiation down arm. No paresthesias. If used too much some arm weakness.   "

## 2021-10-07 NOTE — PROGRESS NOTES
"CC:   Chief Complaint   Patient presents with   • Lab Results     follow up    • Shoulder Pain     right 1 month          HISTORY OF PRESENT ILLNESS: Patient is a 36 y.o. male established patient who presents today to follow up on the following conditions:       Health Maintenance: Completed    Vitamin D deficiency  This is a  chronic condition.   Supplementation: recommend 2000 IU daily  Last Vitamin D level:   VIT D:   Lab Results   Component Value Date/Time    25HYDROXY 21 (L) 2021 1030     Patient denies any muscle aches or fatigue.       Dyslipidemia  This is a chronic condition.   Latest Labs:   Lab Results   Component Value Date/Time    CHOLSTRLTOT 181 2021 10:30 AM     (H) 2021 10:30 AM    HDL 35 (A) 2021 10:30 AM    TRIGLYCERIDE 89 2021 10:30 AM    Discussed lifestyle modifications. At work he is very active. He could use improvement with diet.     Chronic right shoulder pain  Injury as a child- threw a \"shot-put like a baseball\".   Saw PT, they evaluated him for this.   At work lifting more than usual, this aggravates it. Taking Advil for it, helps a little bit. Some radiation down arm. No paresthesias. If used too much some arm weakness.       Patient Active Problem List    Diagnosis Date Noted   • Vitamin D deficiency 10/07/2021   • Dyslipidemia 10/07/2021   • Chronic right shoulder pain 10/07/2021   • Abdominal pain 2021   • Glaucoma 2021      Allergies:Erythromycin and Pcn [penicillins]    Current Outpatient Medications   Medication Sig Dispense Refill   • latanoprost (XALATAN) 0.005 % Solution Administer 1 Drop into both eyes every evening.       No current facility-administered medications for this visit.       Social History     Tobacco Use   • Smoking status: Former Smoker     Types: Cigarettes     Quit date: 2013     Years since quittin.7   • Smokeless tobacco: Never Used   Vaping Use   • Vaping Use: Former   Substance Use Topics   • Alcohol " "use: Not Currently   • Drug use: Never     Social History     Social History Narrative   • Not on file       Family History   Problem Relation Age of Onset   • Hyperlipidemia Mother    • Hypertension Father    • Diabetes Father        Review of Systems:    Constitutional: No Fevers, Chills  Eyes: No vision changes  ENT: No hearing changes  Resp: No Shortness of breath  CV: No Chest pain  GI: No Nausea/Vomiting  MSK: No weakness  Skin: No rashes  Neuro: No Headaches  Psych: No Suicidal ideations    All remaining systems reviewed and found to be negative, except as stated above.    Exam:    /82 (BP Location: Right arm, Patient Position: Sitting, BP Cuff Size: Large adult)   Pulse 78   Temp 37.2 °C (98.9 °F) (Temporal)   Resp 20   Ht 1.753 m (5' 9\")   Wt (!) 134 kg (295 lb)   SpO2 98%  Body mass index is 43.56 kg/m².    General:  Well nourished, well developed male in NAD  HENT: Atraumatic, normocephalic  EYES: Extraocular movements intact  NECK: Supple, FROM  CHEST: No deformities, Equal chest expansion  RESP: Unlabored, no stridor or audible wheeze  HEART: Regular Rate and rhythm.   Extremities: No Clubbing, Cyanosis, or Edema  Right shoulder: Essentially nontender to palpation.  Reproducible pain over the anterior superior aspect with straight arm raise.  Full range of motion.  No obvious bony deformity or edema.  Skin: Warm/dry, without rashes  Neuro: A/O x 4, due to COVID-19- did not have patient remove face mask to test cranial nerves.  Motor/sensory grossly intact  Psych: Normal behavior, normal affect      Lab review:  Labs are reviewed and discussed with a patient  Lab Results   Component Value Date/Time    CHOLSTRLTOT 181 09/08/2021 10:30 AM     (H) 09/08/2021 10:30 AM    HDL 35 (A) 09/08/2021 10:30 AM    TRIGLYCERIDE 89 09/08/2021 10:30 AM       Lab Results   Component Value Date/Time    SODIUM 141 09/08/2021 10:30 AM    POTASSIUM 3.9 09/08/2021 10:30 AM    CHLORIDE 105 09/08/2021 10:30 AM "    CO2 26 09/08/2021 10:30 AM    GLUCOSE 90 09/08/2021 10:30 AM    BUN 13 09/08/2021 10:30 AM    CREATININE 0.82 09/08/2021 10:30 AM     Lab Results   Component Value Date/Time    ALKPHOSPHAT 70 09/08/2021 10:30 AM    ASTSGOT 35 09/08/2021 10:30 AM    ALTSGPT 32 09/08/2021 10:30 AM    TBILIRUBIN 0.6 09/08/2021 10:30 AM      No results found for: HBA1C  No results found for: TSH  No results found for: FREET4    Lab Results   Component Value Date/Time    WBC 6.0 09/08/2021 10:30 AM    RBC 4.88 09/08/2021 10:30 AM    HEMOGLOBIN 15.0 09/08/2021 10:30 AM    HEMATOCRIT 44.7 09/08/2021 10:30 AM    MCV 91.6 09/08/2021 10:30 AM    MCH 30.7 09/08/2021 10:30 AM    MCHC 33.6 (L) 09/08/2021 10:30 AM    MPV 9.0 09/08/2021 10:30 AM    NEUTSPOLYS 56.90 09/08/2021 10:30 AM    LYMPHOCYTES 30.60 09/08/2021 10:30 AM    MONOCYTES 9.80 09/08/2021 10:30 AM    EOSINOPHILS 1.70 09/08/2021 10:30 AM    BASOPHILS 0.50 09/08/2021 10:30 AM          Assessment/Plan:  1. Vitamin D deficiency  Continue vitamin D supplementation at least 2000 IUs a day.  2. Dyslipidemia  - Lipid Profile; Future  Patient will work on lifestyle modifications, repeat labs in 90 days.  3. Chronic right shoulder pain  - DX-SHOULDER 2+ RIGHT; Future  - REFERRAL TO ORTHOPEDICS  Suspect possible bursitis versus tendinitis of the supraspinatus.  We will proceed with getting x-ray to start.  Referring to Dr. Magallanes as well.  May benefit from injection.    Follow-up: Return in about 3 months (around 1/7/2022) for Follow up on labs.    Please note that this dictation was created using voice recognition software. I have made every reasonable attempt to correct obvious errors, but I expect that there are errors of grammar and possibly content that I did not discover before finalizing the note.

## 2021-10-07 NOTE — ASSESSMENT & PLAN NOTE
This is a  chronic condition.   Supplementation: recommend 2000 IU daily  Last Vitamin D level:   VIT D:   Lab Results   Component Value Date/Time    25HYDROXY 21 (L) 09/08/2021 1030     Patient denies any muscle aches or fatigue.

## 2021-10-07 NOTE — TELEPHONE ENCOUNTER
----- Message from Janis Fall P.A.-C. sent at 10/7/2021  1:04 PM PDT -----  Please call patient about their results.     Results showed:     1.  No acute findings.     2.  Focal calcification at the rotator cuff insertion on the greater tuberosity. Findings are consistent with calcific tendinitis.    Recommend proceed with plan, refer to ortho.     Thank you,    Janis Fall PA-C

## 2021-10-21 ENCOUNTER — OFFICE VISIT (OUTPATIENT)
Dept: MEDICAL GROUP | Facility: PHYSICIAN GROUP | Age: 36
End: 2021-10-21
Payer: COMMERCIAL

## 2021-10-21 VITALS
TEMPERATURE: 99.5 F | HEIGHT: 69 IN | SYSTOLIC BLOOD PRESSURE: 138 MMHG | DIASTOLIC BLOOD PRESSURE: 82 MMHG | BODY MASS INDEX: 44.02 KG/M2 | HEART RATE: 84 BPM | OXYGEN SATURATION: 96 % | RESPIRATION RATE: 16 BRPM | WEIGHT: 297.2 LBS

## 2021-10-21 DIAGNOSIS — G89.29 CHRONIC RIGHT SHOULDER PAIN: ICD-10-CM

## 2021-10-21 DIAGNOSIS — M25.511 CHRONIC RIGHT SHOULDER PAIN: ICD-10-CM

## 2021-10-21 PROCEDURE — 99213 OFFICE O/P EST LOW 20 MIN: CPT | Performed by: PHYSICIAN ASSISTANT

## 2021-10-21 RX ORDER — CLINDAMYCIN HYDROCHLORIDE 300 MG/1
CAPSULE ORAL
COMMUNITY
Start: 2021-10-18 | End: 2021-10-28

## 2021-10-21 ASSESSMENT — FIBROSIS 4 INDEX: FIB4 SCORE: 0.7

## 2021-10-21 NOTE — ASSESSMENT & PLAN NOTE
Patient continues to have difficulty with his shoulder, a bit better since he's been off work. The last day he worked he reached overhead and it greatly exacerbated the pain. His last day of work was 10/11/21. Cannot get in to see Dr. Altman until December 27th. Discussed referral to another to get him in sooner. Patient tried to ask for light duty or extra day off work, but unable to get these approved.

## 2021-10-21 NOTE — PROGRESS NOTES
CC:   Chief Complaint   Patient presents with   • Paperwork   • Shoulder Pain          HISTORY OF PRESENT ILLNESS: Patient is a 36 y.o. male established patient who presents today to follow up on the following conditions:       Health Maintenance: Completed    Chronic right shoulder pain  Patient continues to have difficulty with his shoulder, a bit better since he's been off work. The last day he worked he reached overhead and it greatly exacerbated the pain. His last day of work was 10/11/21. Cannot get in to see Dr. Altman until . Discussed referral to another to get him in sooner. Patient tried to ask for light duty or extra day off work, but unable to get these approved.       Patient Active Problem List    Diagnosis Date Noted   • Vitamin D deficiency 10/07/2021   • Dyslipidemia 10/07/2021   • Chronic right shoulder pain 10/07/2021   • Abdominal pain 2021   • Glaucoma 2021      Allergies:Erythromycin and Pcn [penicillins]    Current Outpatient Medications   Medication Sig Dispense Refill   • clindamycin (CLEOCIN) 300 MG Cap      • latanoprost (XALATAN) 0.005 % Solution Administer 1 Drop into both eyes every evening.       No current facility-administered medications for this visit.       Social History     Tobacco Use   • Smoking status: Former Smoker     Types: Cigarettes     Quit date:      Years since quittin.8   • Smokeless tobacco: Never Used   Vaping Use   • Vaping Use: Former   Substance Use Topics   • Alcohol use: Not Currently   • Drug use: Never     Social History     Social History Narrative   • Not on file       Family History   Problem Relation Age of Onset   • Hyperlipidemia Mother    • Hypertension Father    • Diabetes Father        Review of Systems:    Constitutional: No Fevers, Chills  Eyes: No vision changes  ENT: No hearing changes  Resp: No Shortness of breath  CV: No Chest pain  GI: No Nausea/Vomiting  MSK: No weakness  Skin: No rashes  Neuro: No  "Headaches  Psych: No Suicidal ideations    All remaining systems reviewed and found to be negative, except as stated above.    Exam:    /82   Pulse 84   Temp 37.5 °C (99.5 °F) (Temporal)   Resp 16   Ht 1.753 m (5' 9\")   Wt (!) 135 kg (297 lb 3.2 oz)   SpO2 96%  Body mass index is 43.89 kg/m².    General:  Well nourished, well developed male in NAD  HENT: Atraumatic, normocephalic  EYES: Extraocular movements intact  NECK: Supple, FROM  CHEST: No deformities, Equal chest expansion  RESP: Unlabored, no stridor or audible wheeze  HEART: Regular Rate and rhythm.   Extremities: No Clubbing, Cyanosis, or Edema  Right shoulder, mild tenderness palpation over the distal AC joint.  No obvious bony deformities.  Reproducible pain with abduction greater than 90 degrees.  Reproducible pain with resistance against flexion  Skin: Warm/dry, without rashes  Neuro: A/O x 4, due to COVID-19- did not have patient remove face mask to test cranial nerves.  Motor/sensory grossly intact  Psych: Normal behavior, normal affect      Lab review:  Labs are reviewed and discussed with a patient  Lab Results   Component Value Date/Time    CHOLSTRLTOT 181 09/08/2021 10:30 AM     (H) 09/08/2021 10:30 AM    HDL 35 (A) 09/08/2021 10:30 AM    TRIGLYCERIDE 89 09/08/2021 10:30 AM       Lab Results   Component Value Date/Time    SODIUM 141 09/08/2021 10:30 AM    POTASSIUM 3.9 09/08/2021 10:30 AM    CHLORIDE 105 09/08/2021 10:30 AM    CO2 26 09/08/2021 10:30 AM    GLUCOSE 90 09/08/2021 10:30 AM    BUN 13 09/08/2021 10:30 AM    CREATININE 0.82 09/08/2021 10:30 AM     Lab Results   Component Value Date/Time    ALKPHOSPHAT 70 09/08/2021 10:30 AM    ASTSGOT 35 09/08/2021 10:30 AM    ALTSGPT 32 09/08/2021 10:30 AM    TBILIRUBIN 0.6 09/08/2021 10:30 AM      No results found for: HBA1C  No results found for: TSH  No results found for: FREET4    Lab Results   Component Value Date/Time    WBC 6.0 09/08/2021 10:30 AM    RBC 4.88 09/08/2021 10:30 " AM    HEMOGLOBIN 15.0 09/08/2021 10:30 AM    HEMATOCRIT 44.7 09/08/2021 10:30 AM    MCV 91.6 09/08/2021 10:30 AM    MCH 30.7 09/08/2021 10:30 AM    MCHC 33.6 (L) 09/08/2021 10:30 AM    MPV 9.0 09/08/2021 10:30 AM    NEUTSPOLYS 56.90 09/08/2021 10:30 AM    LYMPHOCYTES 30.60 09/08/2021 10:30 AM    MONOCYTES 9.80 09/08/2021 10:30 AM    EOSINOPHILS 1.70 09/08/2021 10:30 AM    BASOPHILS 0.50 09/08/2021 10:30 AM          Assessment/Plan:  1. Chronic right shoulder pain  - REFERRAL TO ORTHOPEDICS  - MR-SHOULDER-W/O RIGHT; Future  Reviewed and filled out [facility] paperwork for the patient today.  Proceeding with MRI for further investigation, again differential diagnosis would include possible rotator cuff tear versus chronic bursitis.  Referring to [facility] being that he cannot get into Dr. Altman's office until the end of December.  Patient continues to have limited use of his shoulder secondary to pain.  He does feel he is unable to return to work at this time.  Will write the patient off until November 11, 2021, we will plan on having a follow-up before this date to review the MRI and continue with a plan of treatment.  Other orders  - clindamycin (CLEOCIN) 300 MG Cap       Follow-up: Return in about 2 weeks (around 11/4/2021) for Follow up on MRI .    Please note that this dictation was created using voice recognition software. I have made every reasonable attempt to correct obvious errors, but I expect that there are errors of grammar and possibly content that I did not discover before finalizing the note.

## 2021-11-03 ENCOUNTER — APPOINTMENT (OUTPATIENT)
Dept: RADIOLOGY | Facility: MEDICAL CENTER | Age: 36
End: 2021-11-03
Attending: PHYSICIAN ASSISTANT
Payer: COMMERCIAL

## 2022-02-23 ENCOUNTER — HOSPITAL ENCOUNTER (OUTPATIENT)
Dept: RADIOLOGY | Facility: MEDICAL CENTER | Age: 37
End: 2022-02-23
Attending: ORTHOPAEDIC SURGERY
Payer: MEDICAID

## 2022-02-23 DIAGNOSIS — M25.511 RIGHT SHOULDER PAIN, UNSPECIFIED CHRONICITY: ICD-10-CM

## 2022-02-23 PROCEDURE — 73221 MRI JOINT UPR EXTREM W/O DYE: CPT | Mod: RT

## 2022-04-22 ENCOUNTER — APPOINTMENT (OUTPATIENT)
Dept: MEDICAL GROUP | Facility: CLINIC | Age: 37
End: 2022-04-22
Payer: MEDICAID

## 2022-05-01 SDOH — ECONOMIC STABILITY: INCOME INSECURITY: IN THE LAST 12 MONTHS, WAS THERE A TIME WHEN YOU WERE NOT ABLE TO PAY THE MORTGAGE OR RENT ON TIME?: NO

## 2022-05-01 SDOH — ECONOMIC STABILITY: TRANSPORTATION INSECURITY
IN THE PAST 12 MONTHS, HAS THE LACK OF TRANSPORTATION KEPT YOU FROM MEDICAL APPOINTMENTS OR FROM GETTING MEDICATIONS?: NO

## 2022-05-01 SDOH — ECONOMIC STABILITY: HOUSING INSECURITY
IN THE LAST 12 MONTHS, WAS THERE A TIME WHEN YOU DID NOT HAVE A STEADY PLACE TO SLEEP OR SLEPT IN A SHELTER (INCLUDING NOW)?: NO

## 2022-05-01 SDOH — HEALTH STABILITY: PHYSICAL HEALTH: ON AVERAGE, HOW MANY MINUTES DO YOU ENGAGE IN EXERCISE AT THIS LEVEL?: 0 MIN

## 2022-05-01 SDOH — ECONOMIC STABILITY: FOOD INSECURITY: WITHIN THE PAST 12 MONTHS, THE FOOD YOU BOUGHT JUST DIDN'T LAST AND YOU DIDN'T HAVE MONEY TO GET MORE.: NEVER TRUE

## 2022-05-01 SDOH — HEALTH STABILITY: PHYSICAL HEALTH: ON AVERAGE, HOW MANY DAYS PER WEEK DO YOU ENGAGE IN MODERATE TO STRENUOUS EXERCISE (LIKE A BRISK WALK)?: 1 DAY

## 2022-05-01 SDOH — ECONOMIC STABILITY: TRANSPORTATION INSECURITY
IN THE PAST 12 MONTHS, HAS LACK OF RELIABLE TRANSPORTATION KEPT YOU FROM MEDICAL APPOINTMENTS, MEETINGS, WORK OR FROM GETTING THINGS NEEDED FOR DAILY LIVING?: NO

## 2022-05-01 SDOH — ECONOMIC STABILITY: FOOD INSECURITY: WITHIN THE PAST 12 MONTHS, YOU WORRIED THAT YOUR FOOD WOULD RUN OUT BEFORE YOU GOT MONEY TO BUY MORE.: NEVER TRUE

## 2022-05-01 SDOH — ECONOMIC STABILITY: TRANSPORTATION INSECURITY
IN THE PAST 12 MONTHS, HAS LACK OF TRANSPORTATION KEPT YOU FROM MEETINGS, WORK, OR FROM GETTING THINGS NEEDED FOR DAILY LIVING?: NO

## 2022-05-01 SDOH — ECONOMIC STABILITY: HOUSING INSECURITY: IN THE LAST 12 MONTHS, HOW MANY PLACES HAVE YOU LIVED?: 1

## 2022-05-01 SDOH — ECONOMIC STABILITY: INCOME INSECURITY: HOW HARD IS IT FOR YOU TO PAY FOR THE VERY BASICS LIKE FOOD, HOUSING, MEDICAL CARE, AND HEATING?: NOT VERY HARD

## 2022-05-01 SDOH — HEALTH STABILITY: MENTAL HEALTH
STRESS IS WHEN SOMEONE FEELS TENSE, NERVOUS, ANXIOUS, OR CAN'T SLEEP AT NIGHT BECAUSE THEIR MIND IS TROUBLED. HOW STRESSED ARE YOU?: TO SOME EXTENT

## 2022-05-01 ASSESSMENT — LIFESTYLE VARIABLES
HOW OFTEN DO YOU HAVE A DRINK CONTAINING ALCOHOL: NEVER
HOW OFTEN DO YOU HAVE SIX OR MORE DRINKS ON ONE OCCASION: NEVER
HOW MANY STANDARD DRINKS CONTAINING ALCOHOL DO YOU HAVE ON A TYPICAL DAY: PATIENT DECLINED

## 2022-05-01 ASSESSMENT — SOCIAL DETERMINANTS OF HEALTH (SDOH)
WITHIN THE PAST 12 MONTHS, YOU WORRIED THAT YOUR FOOD WOULD RUN OUT BEFORE YOU GOT THE MONEY TO BUY MORE: NEVER TRUE
HOW OFTEN DO YOU ATTENT MEETINGS OF THE CLUB OR ORGANIZATION YOU BELONG TO?: NEVER
HOW OFTEN DO YOU HAVE SIX OR MORE DRINKS ON ONE OCCASION: NEVER
HOW OFTEN DO YOU GET TOGETHER WITH FRIENDS OR RELATIVES?: ONCE A WEEK
HOW HARD IS IT FOR YOU TO PAY FOR THE VERY BASICS LIKE FOOD, HOUSING, MEDICAL CARE, AND HEATING?: NOT VERY HARD
IN A TYPICAL WEEK, HOW MANY TIMES DO YOU TALK ON THE PHONE WITH FAMILY, FRIENDS, OR NEIGHBORS?: MORE THAN THREE TIMES A WEEK
HOW MANY DRINKS CONTAINING ALCOHOL DO YOU HAVE ON A TYPICAL DAY WHEN YOU ARE DRINKING: PATIENT DECLINED
ARE YOU MARRIED, WIDOWED, DIVORCED, SEPARATED, NEVER MARRIED, OR LIVING WITH A PARTNER?: LIVING WITH PARTNER
HOW OFTEN DO YOU ATTENT MEETINGS OF THE CLUB OR ORGANIZATION YOU BELONG TO?: NEVER
HOW OFTEN DO YOU ATTEND CHURCH OR RELIGIOUS SERVICES?: NEVER
HOW OFTEN DO YOU GET TOGETHER WITH FRIENDS OR RELATIVES?: ONCE A WEEK
IN A TYPICAL WEEK, HOW MANY TIMES DO YOU TALK ON THE PHONE WITH FAMILY, FRIENDS, OR NEIGHBORS?: MORE THAN THREE TIMES A WEEK
DO YOU BELONG TO ANY CLUBS OR ORGANIZATIONS SUCH AS CHURCH GROUPS UNIONS, FRATERNAL OR ATHLETIC GROUPS, OR SCHOOL GROUPS?: NO
HOW OFTEN DO YOU HAVE A DRINK CONTAINING ALCOHOL: NEVER
HOW OFTEN DO YOU ATTEND CHURCH OR RELIGIOUS SERVICES?: NEVER
ARE YOU MARRIED, WIDOWED, DIVORCED, SEPARATED, NEVER MARRIED, OR LIVING WITH A PARTNER?: LIVING WITH PARTNER
DO YOU BELONG TO ANY CLUBS OR ORGANIZATIONS SUCH AS CHURCH GROUPS UNIONS, FRATERNAL OR ATHLETIC GROUPS, OR SCHOOL GROUPS?: NO

## 2022-05-06 ENCOUNTER — OFFICE VISIT (OUTPATIENT)
Dept: MEDICAL GROUP | Facility: CLINIC | Age: 37
End: 2022-05-06
Payer: MEDICAID

## 2022-05-06 VITALS
OXYGEN SATURATION: 98 % | BODY MASS INDEX: 42.83 KG/M2 | DIASTOLIC BLOOD PRESSURE: 84 MMHG | WEIGHT: 299.2 LBS | SYSTOLIC BLOOD PRESSURE: 132 MMHG | HEART RATE: 113 BPM | HEIGHT: 70 IN | TEMPERATURE: 98.5 F

## 2022-05-06 DIAGNOSIS — E66.01 CLASS 3 SEVERE OBESITY WITHOUT SERIOUS COMORBIDITY WITH BODY MASS INDEX (BMI) OF 40.0 TO 44.9 IN ADULT, UNSPECIFIED OBESITY TYPE (HCC): ICD-10-CM

## 2022-05-06 DIAGNOSIS — R07.89 OTHER CHEST PAIN: ICD-10-CM

## 2022-05-06 DIAGNOSIS — R10.31 GROIN PAIN, RIGHT: ICD-10-CM

## 2022-05-06 DIAGNOSIS — K21.9 GASTROESOPHAGEAL REFLUX DISEASE, UNSPECIFIED WHETHER ESOPHAGITIS PRESENT: ICD-10-CM

## 2022-05-06 PROBLEM — E66.813 CLASS 3 SEVERE OBESITY WITHOUT SERIOUS COMORBIDITY WITH BODY MASS INDEX (BMI) OF 40.0 TO 44.9 IN ADULT (HCC): Status: ACTIVE | Noted: 2022-05-06

## 2022-05-06 LAB
APPEARANCE UR: CLEAR
BILIRUB UR STRIP-MCNC: NORMAL MG/DL
COLOR UR AUTO: YELLOW
GLUCOSE UR STRIP.AUTO-MCNC: NORMAL MG/DL
HBA1C MFR BLD: 5 % (ref 0–5.6)
INT CON NEG: NORMAL
INT CON POS: NORMAL
KETONES UR STRIP.AUTO-MCNC: NORMAL MG/DL
LEUKOCYTE ESTERASE UR QL STRIP.AUTO: NORMAL
NITRITE UR QL STRIP.AUTO: NORMAL
PH UR STRIP.AUTO: 5.5 [PH] (ref 5–8)
PROT UR QL STRIP: NORMAL MG/DL
RBC UR QL AUTO: NORMAL
SP GR UR STRIP.AUTO: >=1.03
UROBILINOGEN UR STRIP-MCNC: NORMAL MG/DL

## 2022-05-06 PROCEDURE — 99203 OFFICE O/P NEW LOW 30 MIN: CPT | Mod: GE | Performed by: STUDENT IN AN ORGANIZED HEALTH CARE EDUCATION/TRAINING PROGRAM

## 2022-05-06 PROCEDURE — 83036 HEMOGLOBIN GLYCOSYLATED A1C: CPT | Mod: GC | Performed by: STUDENT IN AN ORGANIZED HEALTH CARE EDUCATION/TRAINING PROGRAM

## 2022-05-06 PROCEDURE — 81002 URINALYSIS NONAUTO W/O SCOPE: CPT | Mod: GC | Performed by: STUDENT IN AN ORGANIZED HEALTH CARE EDUCATION/TRAINING PROGRAM

## 2022-05-06 RX ORDER — OMEPRAZOLE 20 MG/1
CAPSULE, DELAYED RELEASE ORAL
COMMUNITY
Start: 2022-04-09 | End: 2022-05-06 | Stop reason: SDUPTHER

## 2022-05-06 RX ORDER — LATANOPROST 50 UG/ML
1 SOLUTION/ DROPS OPHTHALMIC
COMMUNITY

## 2022-05-06 RX ORDER — OMEPRAZOLE 20 MG/1
20 CAPSULE, DELAYED RELEASE ORAL DAILY
Qty: 30 CAPSULE | Refills: 3 | Status: SHIPPED | OUTPATIENT
Start: 2022-05-06 | End: 2022-08-22 | Stop reason: SDUPTHER

## 2022-05-06 RX ORDER — LISINOPRIL 10 MG/1
10 TABLET ORAL DAILY
COMMUNITY
Start: 2022-03-21 | End: 2022-05-06

## 2022-05-06 ASSESSMENT — FIBROSIS 4 INDEX: FIB4 SCORE: 0.7

## 2022-05-06 NOTE — ASSESSMENT & PLAN NOTE
Patient has GERD   Patient is not having any alarming symptoms such as dysphagia or hematemesis   Continue omeprazole   Take omeprazole on empty stomach at least 30 minutes before first meal of the day  -Avoid spicy foods, chocolate, hot beverages, alcohol, or other irritating foods.   -Avoid consumption of food or liquids prior to falling asleep or lying flat.   -Avoid NSAIDs and steroids   Follow up as needed

## 2022-05-07 NOTE — ASSESSMENT & PLAN NOTE
Obesity Class (III >40)  Discussed diet   Discussed exercise   Referral to nutrition  Referral to bariatric surgery offered, patient declined.

## 2022-05-07 NOTE — ASSESSMENT & PLAN NOTE
Patient reports 1 month history of groin pain  -He does have bilateral high riding testes, but otherwise distended.  No erythema.  -Negative bilateral inguinal hernia  -Scrotal ultrasound and inguinal ultrasound ordered  -Urinalysis negative for infection  -Follow-up 4 to 6 weeks  -Return precautions discussed

## 2022-05-24 ENCOUNTER — HOSPITAL ENCOUNTER (OUTPATIENT)
Dept: RADIOLOGY | Facility: MEDICAL CENTER | Age: 37
End: 2022-05-24
Attending: STUDENT IN AN ORGANIZED HEALTH CARE EDUCATION/TRAINING PROGRAM
Payer: MEDICAID

## 2022-05-24 DIAGNOSIS — R10.31 GROIN PAIN, RIGHT: ICD-10-CM

## 2022-05-24 PROCEDURE — 76857 US EXAM PELVIC LIMITED: CPT

## 2022-05-24 PROCEDURE — 76870 US EXAM SCROTUM: CPT

## 2022-06-09 ENCOUNTER — TELEPHONE (OUTPATIENT)
Dept: MEDICAL GROUP | Facility: CLINIC | Age: 37
End: 2022-06-09
Payer: MEDICAID

## 2022-06-09 DIAGNOSIS — R10.31 GROIN PAIN, RIGHT: ICD-10-CM

## 2022-06-27 ENCOUNTER — APPOINTMENT (OUTPATIENT)
Dept: MEDICAL GROUP | Facility: CLINIC | Age: 37
End: 2022-06-27
Payer: MEDICAID

## 2022-08-22 DIAGNOSIS — K21.9 GASTROESOPHAGEAL REFLUX DISEASE, UNSPECIFIED WHETHER ESOPHAGITIS PRESENT: ICD-10-CM

## 2022-08-22 RX ORDER — OMEPRAZOLE 20 MG/1
20 CAPSULE, DELAYED RELEASE ORAL DAILY
Qty: 30 CAPSULE | Refills: 3 | Status: SHIPPED | OUTPATIENT
Start: 2022-08-22 | End: 2022-09-02

## 2022-09-02 ENCOUNTER — OFFICE VISIT (OUTPATIENT)
Dept: INTERNAL MEDICINE | Facility: OTHER | Age: 37
End: 2022-09-02
Payer: MEDICAID

## 2022-09-02 VITALS
SYSTOLIC BLOOD PRESSURE: 135 MMHG | HEIGHT: 70 IN | DIASTOLIC BLOOD PRESSURE: 90 MMHG | TEMPERATURE: 97.9 F | OXYGEN SATURATION: 97 % | WEIGHT: 314.6 LBS | BODY MASS INDEX: 45.04 KG/M2 | HEART RATE: 80 BPM

## 2022-09-02 DIAGNOSIS — R03.0 ELEVATED BP WITHOUT DIAGNOSIS OF HYPERTENSION: ICD-10-CM

## 2022-09-02 DIAGNOSIS — M25.562 ACUTE PAIN OF LEFT KNEE: ICD-10-CM

## 2022-09-02 DIAGNOSIS — E66.01 MORBID OBESITY WITH BMI OF 45.0-49.9, ADULT (HCC): ICD-10-CM

## 2022-09-02 DIAGNOSIS — E55.9 VITAMIN D DEFICIENCY: ICD-10-CM

## 2022-09-02 PROBLEM — R10.9 ABDOMINAL PAIN: Status: RESOLVED | Noted: 2021-09-08 | Resolved: 2022-09-02

## 2022-09-02 PROBLEM — R10.31 GROIN PAIN, RIGHT: Status: RESOLVED | Noted: 2022-05-06 | Resolved: 2022-09-02

## 2022-09-02 PROBLEM — R07.89 OTHER CHEST PAIN: Status: RESOLVED | Noted: 2022-05-06 | Resolved: 2022-09-02

## 2022-09-02 PROCEDURE — 99214 OFFICE O/P EST MOD 30 MIN: CPT | Mod: GC | Performed by: STUDENT IN AN ORGANIZED HEALTH CARE EDUCATION/TRAINING PROGRAM

## 2022-09-02 RX ORDER — PSEUDOEPHED/ACETAMINOPH/DIPHEN 30MG-500MG
TABLET ORAL
COMMUNITY
Start: 2022-08-31 | End: 2022-10-12

## 2022-09-02 RX ORDER — METHYLPREDNISOLONE 4 MG/1
TABLET ORAL
COMMUNITY
Start: 2022-08-29 | End: 2022-10-12

## 2022-09-02 RX ORDER — IBUPROFEN 600 MG/1
TABLET ORAL
COMMUNITY
Start: 2022-08-31 | End: 2022-10-12

## 2022-09-02 RX ORDER — MELOXICAM 15 MG/1
15 TABLET ORAL
COMMUNITY
Start: 2022-06-29 | End: 2022-09-02

## 2022-09-02 RX ORDER — OMEPRAZOLE 20 MG/1
TABLET, DELAYED RELEASE ORAL
COMMUNITY
End: 2022-10-12

## 2022-09-02 RX ORDER — MELOXICAM 15 MG/1
TABLET ORAL
COMMUNITY
End: 2022-09-02

## 2022-09-02 ASSESSMENT — FIBROSIS 4 INDEX: FIB4 SCORE: 0.72

## 2022-09-02 ASSESSMENT — PATIENT HEALTH QUESTIONNAIRE - PHQ9: CLINICAL INTERPRETATION OF PHQ2 SCORE: 0

## 2022-09-02 NOTE — PATIENT INSTRUCTIONS
-Do labs (fasting) 1 week before next visit.   -Measure blood pressure once daily at the same time of the day. No smoking, alcohol, drugs before measuring blood pressure.  Rest for 10 minutes before measuring blood pressure with feet down on the ground and arm at heart level in sitting position. Measure blood pressure for 2 weeks before next visit. Bring the blood pressure log on next visit.  -placed referral to nutrition    -follow RICE instruction  -Usman Horn

## 2022-09-02 NOTE — PROGRESS NOTES
Reason to establish: New patient to establish    CC: left knee pain    HPI:   This is 36 yo male with a history of obesity, vitamin d deficiency,calcific tendonitis of R shoulder who is presenting with 1 week history of left knee pain.    1 week ago, while he was doing the dishes and tried to get around his dogs, leaned laterally, and suddenly heard popping. Since then, his left knee has been hurting. Currently on medrol dose haley which was prescribed when he went to urgent care 4 days ago. Had swelling in left knee, and medrol dose haley calmed it down.  Feels numb from knee to toe when wearing brace.  Has been wearing brace since 4 days ago which was issued by Urgent care; brace helps. Currently, he feels less numb in his foot. Tylenol and ibuprofen do not help. Pain is worse and sharp when not wearing brace. Walking makes it worse although able to bear weight. Also, tried icy hot and biofreeze but didn't help.  X ray was done at urgent care and was unremarkable without fracture or acute finding. Went to ED 3 days ago and got referral to orthopedic surgery but his insurance will only accept the referral from primary care. Has not had a similar episode before. No fever, chills.    Patient continues to gain weight although he tries to eat healthier. He would like referral to nutrition. He declines the bariatric surgery option for now.    Blood pressure in clinic is elevated today at 135/90, likely due to pain. Denies previous history of hypertension.    Denies chest pain, palpitation, shortness of breath, weakness, dizziness/lightheadedness, facial droop, paresthesia, vision change.    Patient Active Problem List    Diagnosis Date Noted    Morbid obesity with BMI of 45.0-49.9, adult ([facility]) 09/02/2022    GERD (gastroesophageal reflux disease) 05/06/2022    Class 3 severe obesity without serious comorbidity with body mass index (BMI) of 40.0 to 44.9 in adult ([facility]) 05/06/2022    Vitamin D deficiency 10/07/2021     Dyslipidemia 10/07/2021    Chronic right shoulder pain 10/07/2021    Glaucoma 2021       Past Medical History:   Diagnosis Date    Abdominal pain 2021    Groin pain, right 2022    Other chest pain 2022    Patient has intermittent chest pain He is followed by cardiology outpatient He has tachycardia in office today.  I discussed with the patient that he has had intermittent tachycardia, but he does not experience any symptoms of palpitations.  When I examined the patient I did not appreciate tachycardia.  Suspect this is paroxysmal in the setting of anxiety.  He says that he did wear a heart monitor       Current Outpatient Medications   Medication Sig Dispense Refill    ACETAMINOPHEN EXTRA STRENGTH 500 MG Tab TAKE 1 TABLET BY MOUTH EVERY 6 HOURS FOR 7 DAYS      ibuprofen (MOTRIN) 600 MG Tab TAKE 1 TABLET BY MOUTH EVERY 6 HOURS FOR 7 DAYS AS NEEDED FOR PAIN. NOT TO EXCEED 3200 MG DAILY      methylPREDNISolone (MEDROL DOSEPAK) 4 MG Tablet Therapy Pack FOLLOW PACKAGE DIRECTIONS      omeprazole (PRILOSEC OTC) 20 MG tablet omeprazole 20 mg tablet,delayed release   20mg 1/day      latanoprost (XALATAN) 0.005 % Solution Administer 1 Drop into affected eye(s).       No current facility-administered medications for this visit.       Allergies as of 2022 - Reviewed 2022   Allergen Reaction Noted    Erythromycin  2020    Pcn [penicillins]  2020       Social History     Socioeconomic History    Marital status: Single     Spouse name: Not on file    Number of children: Not on file    Years of education: Not on file    Highest education level: Some college, no degree   Occupational History    Not on file   Tobacco Use    Smoking status: Former     Types: Cigarettes     Quit date:      Years since quittin.6    Smokeless tobacco: Never   Vaping Use    Vaping Use: Former   Substance and Sexual Activity    Alcohol use: Not Currently    Drug use: Never    Sexual activity: Not Currently    Other Topics Concern    Not on file   Social History Narrative    Not on file     Social Determinants of Health     Financial Resource Strain: Low Risk     Difficulty of Paying Living Expenses: Not very hard   Food Insecurity: No Food Insecurity    Worried About Running Out of Food in the Last Year: Never true    Ran Out of Food in the Last Year: Never true   Transportation Needs: No Transportation Needs    Lack of Transportation (Medical): No    Lack of Transportation (Non-Medical): No   Physical Activity: Inactive    Days of Exercise per Week: 1 day    Minutes of Exercise per Session: 0 min   Stress: Stress Concern Present    Feeling of Stress : To some extent   Social Connections: Moderately Isolated    Frequency of Communication with Friends and Family: More than three times a week    Frequency of Social Gatherings with Friends and Family: Once a week    Attends Cheondoism Services: Never    Active Member of Clubs or Organizations: No    Attends Club or Organization Meetings: Never    Marital Status: Living with partner   Intimate Partner Violence: Not on file   Housing Stability: Low Risk     Unable to Pay for Housing in the Last Year: No    Number of Places Lived in the Last Year: 1    Unstable Housing in the Last Year: No       Family History   Problem Relation Age of Onset    Hyperlipidemia Mother     Hypertension Father     Diabetes Father        Past Surgical History:   Procedure Laterality Date    EXC./BIOPSY MASS BACK      cyst removal on back       ROS: As per HPI. Additional pertinent systems as noted below.  Constitutional: Negative for chills and fever.   HENT: Negative for ear pain and sore throat.    Eyes: Negative for discharge and redness.   Respiratory: Negative for cough, hemoptysis, wheezing and stridor.    Cardiovascular: Negative for chest pain, palpitations and leg swelling.   Gastrointestinal: Negative for abdominal pain, constipation, diarrhea, heartburn, nausea and vomiting.  "  Genitourinary: Negative for dysuria, flank pain and hematuria.   Musculoskeletal: Negative for falls   Skin: Negative for itching and rash.   Neurological: Negative for dizziness, seizures, loss of consciousness and headaches.   Endo/Heme/Allergies: Negative for polydipsia. Does not bruise/bleed easily.   Psychiatric/Behavioral: Negative for substance abuse and suicidal ideas.       BP (!) 135/90 (BP Location: Left arm, Patient Position: Sitting, BP Cuff Size: Adult)   Pulse 80   Temp 36.6 °C (97.9 °F) (Temporal)   Ht 1.765 m (5' 9.5\")   Wt (!) 143 kg (314 lb 9.6 oz)   SpO2 97%   BMI 45.79 kg/m²     Physical Exam  Constitutional:       General: He is in acute distress.      Appearance: He is obese. He is not diaphoretic.   HENT:      Head: Normocephalic and atraumatic.      Right Ear: External ear normal.      Left Ear: External ear normal.      Nose: Nose normal. No rhinorrhea.      Mouth/Throat:      Pharynx: Oropharynx is clear. No oropharyngeal exudate.   Eyes:      General: No scleral icterus.        Right eye: No discharge.         Left eye: No discharge.      Extraocular Movements: Extraocular movements intact.      Conjunctiva/sclera: Conjunctivae normal.   Cardiovascular:      Rate and Rhythm: Normal rate and regular rhythm.      Heart sounds: Normal heart sounds. No murmur heard.  Pulmonary:      Effort: Pulmonary effort is normal. No respiratory distress.      Breath sounds: Normal breath sounds. No wheezing.   Chest:      Chest wall: No tenderness.   Abdominal:      General: Abdomen is flat. Bowel sounds are normal. There is no distension.      Palpations: Abdomen is soft.      Tenderness: There is no abdominal tenderness. There is no guarding or rebound.   Musculoskeletal:         General: Tenderness and signs of injury present. No swelling or deformity.      Cervical back: Normal range of motion and neck supple.      Right lower leg: No edema.      Left lower leg: No edema.      Comments: Left " knee:  Positive for Lachman test, anterior drawer test, varus test and [facility] tests (both valgus and varus stress).  Negative for valgus stress test  Negative for posterior drawer test.  No joint line tenderness.  No warmth or erythema.    No tenderness elicited on left hip on external and internal rotation.    R knee and R hip with full range of motion and no tenderness, warmth or erythema.   Lymphadenopathy:      Cervical: No cervical adenopathy.   Skin:     General: Skin is warm.      Capillary Refill: Capillary refill takes less than 2 seconds.      Coloration: Skin is not jaundiced.      Findings: No bruising.   Neurological:      General: No focal deficit present.      Mental Status: He is alert and oriented to person, place, and time.      Cranial Nerves: No cranial nerve deficit.      Sensory: No sensory deficit.   Psychiatric:         Mood and Affect: Mood normal.         Behavior: Behavior normal.         Thought Content: Thought content normal.         Judgment: Judgment normal.       Note: I have reviewed all pertinent labs and diagnostic tests associated with this visit with specific comments listed under the assessment and plan below    Assessment and Plan    1. Acute pain of left knee  2 week history after tripping over the dog. Physical exam does not isolate a spot of injury. Able to bear weight. Brace helps.  -Concern for ligament tear/sprain or meniscal tear(less likely).     Plan:  -Referral to Orthopedics  -RICE instruction provided    2. Morbid obesity with BMI of 45.0-49.9, adult ([facility])  Patient continues to gain weight although he tries to eat healthier. He would like referral to nutrition. He declines the bariatric surgery option for now.    Plan:  -emphasized the importance of healthy diet and exercise(after knee pain resolves)  - Lipid Profile; Future  - Patient identified as having weight management issue.  Appropriate orders and counseling given.  - Referral to Nutrition Services      3.  Elevated BP without diagnosis of hypertension  Blood pressure in clinic is elevated today at 135/90, likely due to pain. Denies previous history of hypertension.      4. Vitamin D deficiency  Vitamin D level 21 in September 2021    Plan:  - VITAMIN D,25 HYDROXY (DEFICIENCY); Future     Followup: Return in about 4 weeks (around 9/30/2022).    Signed by: Stephen Foreman M.D.

## 2022-09-12 ENCOUNTER — TELEPHONE (OUTPATIENT)
Dept: INTERNAL MEDICINE | Facility: OTHER | Age: 37
End: 2022-09-12
Payer: MEDICAID

## 2022-09-12 NOTE — TELEPHONE ENCOUNTER
Sending message to Dr. Foreman because it looks like he states in his note that he was suppose to be place a referral in for him

## 2022-09-12 NOTE — TELEPHONE ENCOUNTER
Patient received a referral for orthopedic surgery and it was supposed to be sent to [facility].  I do not see in the referral notes that it was sent anywhere?

## 2022-10-12 ENCOUNTER — OFFICE VISIT (OUTPATIENT)
Dept: INTERNAL MEDICINE | Facility: OTHER | Age: 37
End: 2022-10-12
Payer: MEDICAID

## 2022-10-12 VITALS
HEIGHT: 70 IN | BODY MASS INDEX: 45.1 KG/M2 | OXYGEN SATURATION: 97 % | WEIGHT: 315 LBS | DIASTOLIC BLOOD PRESSURE: 80 MMHG | SYSTOLIC BLOOD PRESSURE: 130 MMHG | TEMPERATURE: 97.9 F | HEART RATE: 69 BPM

## 2022-10-12 DIAGNOSIS — R03.0 ELEVATED BP WITHOUT DIAGNOSIS OF HYPERTENSION: ICD-10-CM

## 2022-10-12 DIAGNOSIS — R12 HEARTBURN: ICD-10-CM

## 2022-10-12 DIAGNOSIS — J11.1 INFLUENZA: ICD-10-CM

## 2022-10-12 DIAGNOSIS — E66.01 MORBID OBESITY WITH BMI OF 45.0-49.9, ADULT (HCC): ICD-10-CM

## 2022-10-12 PROCEDURE — 90686 IIV4 VACC NO PRSV 0.5 ML IM: CPT | Performed by: STUDENT IN AN ORGANIZED HEALTH CARE EDUCATION/TRAINING PROGRAM

## 2022-10-12 PROCEDURE — 90471 IMMUNIZATION ADMIN: CPT | Performed by: STUDENT IN AN ORGANIZED HEALTH CARE EDUCATION/TRAINING PROGRAM

## 2022-10-12 PROCEDURE — 99214 OFFICE O/P EST MOD 30 MIN: CPT | Mod: 25,GC | Performed by: STUDENT IN AN ORGANIZED HEALTH CARE EDUCATION/TRAINING PROGRAM

## 2022-10-12 RX ORDER — MELOXICAM 15 MG/1
15 TABLET ORAL DAILY
COMMUNITY
Start: 2022-10-04 | End: 2022-10-12 | Stop reason: SDUPTHER

## 2022-10-12 RX ORDER — FAMOTIDINE 20 MG/1
20 TABLET, FILM COATED ORAL 2 TIMES DAILY
Qty: 60 TABLET | Refills: 3 | Status: SHIPPED | OUTPATIENT
Start: 2022-10-12 | End: 2023-02-16

## 2022-10-12 RX ORDER — ACETAMINOPHEN 325 MG/1
650 TABLET ORAL EVERY 6 HOURS PRN
Qty: 30 TABLET | Refills: 2 | Status: SHIPPED | OUTPATIENT
Start: 2022-10-12 | End: 2023-02-16

## 2022-10-12 RX ORDER — MELOXICAM 15 MG/1
7.5 TABLET ORAL DAILY
Qty: 30 TABLET | COMMUNITY
Start: 2022-10-12 | End: 2023-02-16

## 2022-10-12 ASSESSMENT — FIBROSIS 4 INDEX: FIB4 SCORE: 0.72

## 2022-10-12 NOTE — PROGRESS NOTES
CC: left knee pain    HPI:   This is 36 yo male with a history of obesity, vitamin d deficiency,calcific tendonitis of R shoulder who is presenting for follow up of left knee pain and chronic problems    #left knee pain  5 weeks ago while he was doing the dishes and tried to get around his dogs, leaned laterally, and suddenly heard popping. Since then, his left knee has been hurting. He completed medrol dose haley prescribed by urgent care. Swelling improved. Pain improved. He is able to bear weight. X ray was negative for acute fracture or finding. Patient saw orthopedic surgery who ordered MRI which is pending.  Patient is currently taking meloxicam 15 mg daily.    #elevated bp  Patient's blood pressure is slightly elevated at 130/80 in clinic today.    #obesity  Patient has not been able to exercise due to the knee.      Denies chest pain, palpitation, shortness of breath, weakness, dizziness/lightheadedness, facial droop, paresthesia, vision change. No fever, chills.    Patient Active Problem List    Diagnosis Date Noted    Morbid obesity with BMI of 45.0-49.9, adult ([facility]) 09/02/2022    GERD (gastroesophageal reflux disease) 05/06/2022    Class 3 severe obesity without serious comorbidity with body mass index (BMI) of 40.0 to 44.9 in adult ([facility]) 05/06/2022    Vitamin D deficiency 10/07/2021    Dyslipidemia 10/07/2021    Chronic right shoulder pain 10/07/2021    Glaucoma 09/08/2021       Past Medical History:   Diagnosis Date    Abdominal pain 9/8/2021    Groin pain, right 5/6/2022    Other chest pain 5/6/2022    Patient has intermittent chest pain He is followed by cardiology outpatient He has tachycardia in office today.  I discussed with the patient that he has had intermittent tachycardia, but he does not experience any symptoms of palpitations.  When I examined the patient I did not appreciate tachycardia.  Suspect this is paroxysmal in the setting of anxiety.  He says that he did wear a heart monitor        Current Outpatient Medications   Medication Sig Dispense Refill    famotidine (PEPCID) 20 MG Tab Take 1 Tablet by mouth 2 times a day. 60 Tablet 3    acetaminophen (TYLENOL) 325 MG Tab Take 2 Tablets by mouth every 6 hours as needed for Mild Pain or Moderate Pain. 30 Tablet 2    diclofenac sodium (VOLTAREN) 1 % Gel Apply 2 g topically 4 times a day as needed (knee pain). 2 g 1    meloxicam (MOBIC) 15 MG tablet Take 0.5 Tablets by mouth every day. 30 Tablet     latanoprost (XALATAN) 0.005 % Solution Administer 1 Drop into affected eye(s).       No current facility-administered medications for this visit.       Allergies as of 10/12/2022 - Reviewed 10/12/2022   Allergen Reaction Noted    Erythromycin  2020    Pcn [penicillins]  2020       Social History     Socioeconomic History    Marital status: Single     Spouse name: Not on file    Number of children: Not on file    Years of education: Not on file    Highest education level: Some college, no degree   Occupational History    Not on file   Tobacco Use    Smoking status: Former     Types: Cigarettes     Quit date:      Years since quittin.7    Smokeless tobacco: Never   Vaping Use    Vaping Use: Never used   Substance and Sexual Activity    Alcohol use: Not Currently    Drug use: Never    Sexual activity: Not Currently   Other Topics Concern    Not on file   Social History Narrative    Not on file     Social Determinants of Health     Financial Resource Strain: Low Risk     Difficulty of Paying Living Expenses: Not very hard   Food Insecurity: No Food Insecurity    Worried About Running Out of Food in the Last Year: Never true    Ran Out of Food in the Last Year: Never true   Transportation Needs: No Transportation Needs    Lack of Transportation (Medical): No    Lack of Transportation (Non-Medical): No   Physical Activity: Inactive    Days of Exercise per Week: 1 day    Minutes of Exercise per Session: 0 min   Stress: Stress Concern Present  "   Feeling of Stress : To some extent   Social Connections: Moderately Isolated    Frequency of Communication with Friends and Family: More than three times a week    Frequency of Social Gatherings with Friends and Family: Once a week    Attends Orthodox Services: Never    Active Member of Clubs or Organizations: No    Attends Club or Organization Meetings: Never    Marital Status: Living with partner   Intimate Partner Violence: Not on file   Housing Stability: Low Risk     Unable to Pay for Housing in the Last Year: No    Number of Places Lived in the Last Year: 1    Unstable Housing in the Last Year: No       Family History   Problem Relation Age of Onset    Hyperlipidemia Mother     Hypertension Father     Diabetes Father        Past Surgical History:   Procedure Laterality Date    EXC./BIOPSY MASS BACK      cyst removal on back       ROS:   See HPI    /80 (BP Location: Left arm, Patient Position: Sitting, BP Cuff Size: Large adult)   Pulse 69   Temp 36.6 °C (97.9 °F) (Temporal)   Ht 1.768 m (5' 9.6\")   Wt (!) 144 kg (316 lb 12.8 oz)   SpO2 97%   BMI 45.98 kg/m²     Physical Exam  Constitutional:       General: He is not in acute distress.     Appearance: He is obese. He is not diaphoretic.   HENT:      Head: Normocephalic and atraumatic.      Right Ear: External ear normal.      Left Ear: External ear normal.      Nose: Nose normal. No rhinorrhea.      Mouth/Throat:      Pharynx: Oropharynx is clear. No oropharyngeal exudate.   Eyes:      General: No scleral icterus.        Right eye: No discharge.         Left eye: No discharge.      Extraocular Movements: Extraocular movements intact.      Conjunctiva/sclera: Conjunctivae normal.   Cardiovascular:      Rate and Rhythm: Normal rate and regular rhythm.      Heart sounds: Normal heart sounds. No murmur heard.  Pulmonary:      Effort: Pulmonary effort is normal. No respiratory distress.      Breath sounds: Normal breath sounds. No wheezing.   Chest: "      Chest wall: No tenderness.   Abdominal:      General: Abdomen is flat. Bowel sounds are normal. There is no distension.      Palpations: Abdomen is soft.      Tenderness: There is no abdominal tenderness. There is no guarding or rebound.   Musculoskeletal:         General: Tenderness present. No swelling or deformity.      Cervical back: Normal range of motion and neck supple.      Right lower leg: No edema.      Left lower leg: No edema.      Comments: Wears brace.   Lymphadenopathy:      Cervical: No cervical adenopathy.   Skin:     General: Skin is warm.      Capillary Refill: Capillary refill takes less than 2 seconds.      Coloration: Skin is not jaundiced.      Findings: No bruising.   Neurological:      General: No focal deficit present.      Mental Status: He is alert and oriented to person, place, and time.      Cranial Nerves: No cranial nerve deficit.      Sensory: No sensory deficit.   Psychiatric:         Mood and Affect: Mood normal.         Behavior: Behavior normal.         Thought Content: Thought content normal.         Judgment: Judgment normal.       Note: I have reviewed all pertinent labs and diagnostic tests associated with this visit with specific comments listed under the assessment and plan below    Assessment and Plan    #Acute pain of left knee  Pain is improving   Able to bear weight. Brace helps.  -Concern for ligament tear/sprain or meniscal tear(less likely).     Plan:  -continue to follow with ortho  -Avoid NSAID and use tylenol.   -decrease meloxicam to 7.5 mg daily (half a tablet), take tylenol 2-3 times a day as needed.  -apply voltaren gel to knee.      #Elevated BP without diagnosis of hypertension  Blood pressure in clinic is elevated today at 130/80.  Denies previous history of hypertension. Explained that use of NSAID can increase his blood pressure.    Plan:  instructed patient to measure blood pressure and bring the log to next visit.   Continue DASH diet  Avoid NSAID  and use tylenol    #Morbid obesity with BMI of 45.0-49.9, adult ([facility])  Patient continues to gain weight although he tries to eat healthier. He would like referral to nutrition. He declines the bariatric surgery option for now.    Plan:  Follow up with nutrition  Continue DASH diet      #Vitamin D deficiency  Vitamin D level 21 in September 2021    Plan:  - VITAMIN D,25 HYDROXY (DEFICIENCY); Future     Followup: Return in about 4 months (around 2/12/2023).    Signed by: Stephen Foreman M.D.

## 2023-02-16 ENCOUNTER — OFFICE VISIT (OUTPATIENT)
Dept: MEDICAL GROUP | Facility: PHYSICIAN GROUP | Age: 38
End: 2023-02-16
Payer: COMMERCIAL

## 2023-02-16 ENCOUNTER — HOSPITAL ENCOUNTER (OUTPATIENT)
Dept: LAB | Facility: MEDICAL CENTER | Age: 38
End: 2023-02-16
Attending: NURSE PRACTITIONER
Payer: COMMERCIAL

## 2023-02-16 VITALS
RESPIRATION RATE: 20 BRPM | HEART RATE: 84 BPM | TEMPERATURE: 99 F | BODY MASS INDEX: 46.65 KG/M2 | OXYGEN SATURATION: 98 % | DIASTOLIC BLOOD PRESSURE: 90 MMHG | HEIGHT: 69 IN | WEIGHT: 315 LBS | SYSTOLIC BLOOD PRESSURE: 150 MMHG

## 2023-02-16 DIAGNOSIS — Z13.29 SCREENING FOR THYROID DISORDER: ICD-10-CM

## 2023-02-16 DIAGNOSIS — Z13.0 SCREENING FOR DEFICIENCY ANEMIA: ICD-10-CM

## 2023-02-16 DIAGNOSIS — I10 PRIMARY HYPERTENSION: ICD-10-CM

## 2023-02-16 DIAGNOSIS — E78.5 DYSLIPIDEMIA: ICD-10-CM

## 2023-02-16 DIAGNOSIS — Z83.3 FAMILY HISTORY OF DIABETES MELLITUS IN FATHER: ICD-10-CM

## 2023-02-16 DIAGNOSIS — E55.9 VITAMIN D DEFICIENCY: ICD-10-CM

## 2023-02-16 DIAGNOSIS — E66.01 MORBID OBESITY WITH BMI OF 45.0-49.9, ADULT (HCC): ICD-10-CM

## 2023-02-16 DIAGNOSIS — Z11.59 NEED FOR HEPATITIS C SCREENING TEST: ICD-10-CM

## 2023-02-16 DIAGNOSIS — K21.9 GASTROESOPHAGEAL REFLUX DISEASE, UNSPECIFIED WHETHER ESOPHAGITIS PRESENT: ICD-10-CM

## 2023-02-16 LAB
25(OH)D3 SERPL-MCNC: 27 NG/ML (ref 30–100)
ALBUMIN SERPL BCP-MCNC: 4.4 G/DL (ref 3.2–4.9)
ALBUMIN/GLOB SERPL: 1.4 G/DL
ALP SERPL-CCNC: 79 U/L (ref 30–99)
ALT SERPL-CCNC: 31 U/L (ref 2–50)
ANION GAP SERPL CALC-SCNC: 11 MMOL/L (ref 7–16)
AST SERPL-CCNC: 28 U/L (ref 12–45)
BILIRUB SERPL-MCNC: 0.6 MG/DL (ref 0.1–1.5)
BUN SERPL-MCNC: 10 MG/DL (ref 8–22)
CALCIUM ALBUM COR SERPL-MCNC: 9 MG/DL (ref 8.5–10.5)
CALCIUM SERPL-MCNC: 9.3 MG/DL (ref 8.5–10.5)
CHLORIDE SERPL-SCNC: 102 MMOL/L (ref 96–112)
CHOLEST SERPL-MCNC: 179 MG/DL (ref 100–199)
CO2 SERPL-SCNC: 26 MMOL/L (ref 20–33)
CREAT SERPL-MCNC: 0.78 MG/DL (ref 0.5–1.4)
ERYTHROCYTE [DISTWIDTH] IN BLOOD BY AUTOMATED COUNT: 41.3 FL (ref 35.9–50)
EST. AVERAGE GLUCOSE BLD GHB EST-MCNC: 105 MG/DL
FASTING STATUS PATIENT QL REPORTED: NORMAL
GFR SERPLBLD CREATININE-BSD FMLA CKD-EPI: 116 ML/MIN/1.73 M 2
GLOBULIN SER CALC-MCNC: 3.1 G/DL (ref 1.9–3.5)
GLUCOSE SERPL-MCNC: 86 MG/DL (ref 65–99)
HBA1C MFR BLD: 5.3 % (ref 4–5.6)
HCT VFR BLD AUTO: 45.3 % (ref 42–52)
HCV AB SER QL: NORMAL
HDLC SERPL-MCNC: 34 MG/DL
HGB BLD-MCNC: 15.2 G/DL (ref 14–18)
LDLC SERPL CALC-MCNC: 124 MG/DL
MCH RBC QN AUTO: 29.2 PG (ref 27–33)
MCHC RBC AUTO-ENTMCNC: 33.6 G/DL (ref 33.7–35.3)
MCV RBC AUTO: 86.9 FL (ref 81.4–97.8)
PLATELET # BLD AUTO: 307 K/UL (ref 164–446)
PMV BLD AUTO: 8.6 FL (ref 9–12.9)
POTASSIUM SERPL-SCNC: 3.6 MMOL/L (ref 3.6–5.5)
PROT SERPL-MCNC: 7.5 G/DL (ref 6–8.2)
RBC # BLD AUTO: 5.21 M/UL (ref 4.7–6.1)
SODIUM SERPL-SCNC: 139 MMOL/L (ref 135–145)
TRIGL SERPL-MCNC: 106 MG/DL (ref 0–149)
TSH SERPL DL<=0.005 MIU/L-ACNC: 0.86 UIU/ML (ref 0.38–5.33)
WBC # BLD AUTO: 5.9 K/UL (ref 4.8–10.8)

## 2023-02-16 PROCEDURE — 85027 COMPLETE CBC AUTOMATED: CPT

## 2023-02-16 PROCEDURE — 82306 VITAMIN D 25 HYDROXY: CPT

## 2023-02-16 PROCEDURE — 99214 OFFICE O/P EST MOD 30 MIN: CPT | Performed by: NURSE PRACTITIONER

## 2023-02-16 PROCEDURE — 84443 ASSAY THYROID STIM HORMONE: CPT

## 2023-02-16 PROCEDURE — 83036 HEMOGLOBIN GLYCOSYLATED A1C: CPT

## 2023-02-16 PROCEDURE — 80061 LIPID PANEL: CPT

## 2023-02-16 PROCEDURE — 80053 COMPREHEN METABOLIC PANEL: CPT

## 2023-02-16 PROCEDURE — 86803 HEPATITIS C AB TEST: CPT

## 2023-02-16 PROCEDURE — 36415 COLL VENOUS BLD VENIPUNCTURE: CPT

## 2023-02-16 RX ORDER — OMEPRAZOLE 20 MG/1
20 CAPSULE, DELAYED RELEASE ORAL DAILY
Qty: 90 CAPSULE | Refills: 3 | Status: SHIPPED | OUTPATIENT
Start: 2023-02-16 | End: 2023-02-17 | Stop reason: SDUPTHER

## 2023-02-16 RX ORDER — MELOXICAM 7.5 MG/1
TABLET ORAL
COMMUNITY
End: 2023-02-16

## 2023-02-16 RX ORDER — LOSARTAN POTASSIUM 50 MG/1
50 TABLET ORAL DAILY
Qty: 90 TABLET | Refills: 0 | Status: SHIPPED | OUTPATIENT
Start: 2023-02-16 | End: 2023-02-17 | Stop reason: SDUPTHER

## 2023-02-16 RX ORDER — OMEPRAZOLE 20 MG/1
20 CAPSULE, DELAYED RELEASE ORAL DAILY
COMMUNITY
End: 2023-02-16 | Stop reason: SDUPTHER

## 2023-02-16 RX ORDER — OMEPRAZOLE 20 MG/1
CAPSULE, DELAYED RELEASE ORAL
COMMUNITY
End: 2023-02-16

## 2023-02-16 ASSESSMENT — FIBROSIS 4 INDEX: FIB4 SCORE: 0.36

## 2023-02-16 ASSESSMENT — PATIENT HEALTH QUESTIONNAIRE - PHQ9: CLINICAL INTERPRETATION OF PHQ2 SCORE: 0

## 2023-02-16 NOTE — ASSESSMENT & PLAN NOTE
"New diagnosis.  Reports diet is \"not great\".   He is not following a low-salt diet.  He is not exercising regularly.  He is not taking baby aspirin daily.   He is not monitoring BP at home.   Denies symptoms low BP: light-headed, tunnel-vision, unusual fatigue, dizziness.  Denies symptoms high BP: pounding headache, visual changes, palpitations, flushed face.   Denies chest pain, shortness of breath, dyspnea on exertion.     "

## 2023-02-16 NOTE — ASSESSMENT & PLAN NOTE
Chronic and ongoing. Currently taking Omeprazole 20 mg daily. He states that his diet is not the best and he does not exercise.

## 2023-02-16 NOTE — ASSESSMENT & PLAN NOTE
"Chronic, ongoing.  Not taking any cholesterol lowering medications.  Reports diet is \"not the best\".   He is not following a low-cholesterol diet.  He is not exercising regularly.  He is not taking ASA daily.  He denies dizziness, claudication, or chest pain.  Due for updated lab work.     09/08/21 10:30   Cholesterol,Tot 181   Triglycerides 89   HDL 35 !    (H)     "

## 2023-02-16 NOTE — PROGRESS NOTES
"Subjective  Chief Complaint  Establish care to manage his chronic conditions    History of Present Illness  Hayden Wilson is a 37 y.o. male. This patient is here today to establish care.  His prior PCP was Dr. Stephen Foreman.    Morbid obesity with BMI of 45.0-49.9, adult (HCC)  Chronic and ongoing. Currently not working on his diet or exercise.    Primary hypertension  New diagnosis.  Reports diet is \"not great\".   He is not following a low-salt diet.  He is not exercising regularly.  He is not taking baby aspirin daily.   He is not monitoring BP at home.   Denies symptoms low BP: light-headed, tunnel-vision, unusual fatigue, dizziness.  Denies symptoms high BP: pounding headache, visual changes, palpitations, flushed face.   Denies chest pain, shortness of breath, dyspnea on exertion.       Vitamin D deficiency  Chronic, ongoing.  Denies fatigue.  Denies any muscle weakness.  No history of CKD.  Reports having a good diet, including dairy products.  No history of IBD's, celiac, CF, or surgeries causing malabsorption.  Patient is obese.  Is not taking vitamin d or calcium supplement.      09/08/21 10:30   25-Hydroxy   Vitamin D 25 21 (L)       Dyslipidemia  Chronic, ongoing.  Not taking any cholesterol lowering medications.  Reports diet is \"not the best\".   He is not following a low-cholesterol diet.  He is not exercising regularly.  He is not taking ASA daily.  He denies dizziness, claudication, or chest pain.  Due for updated lab work.     09/08/21 10:30   Cholesterol,Tot 181   Triglycerides 89   HDL 35 !    (H)       GERD (gastroesophageal reflux disease)  Chronic and ongoing. Currently taking Omeprazole 20 mg daily. He states that his diet is not the best and he does not exercise.    Past Medical History    Allergies: Erythromycin and Pcn [penicillins]  Past Medical History:   Diagnosis Date    Abdominal pain 9/8/2021    Groin pain, right 5/6/2022    Other chest pain 5/6/2022    Patient has " "intermittent chest pain He is followed by cardiology outpatient He has tachycardia in office today.  I discussed with the patient that he has had intermittent tachycardia, but he does not experience any symptoms of palpitations.  When I examined the patient I did not appreciate tachycardia.  Suspect this is paroxysmal in the setting of anxiety.  He says that he did wear a heart monitor    Primary hypertension 2/16/2023     Past Surgical History:   Procedure Laterality Date    EXC./BIOPSY MASS BACK      cyst removal on back     Current Outpatient Medications Ordered in Epic   Medication Sig Dispense Refill    losartan (COZAAR) 50 MG Tab Take 1 Tablet by mouth every day. 90 Tablet 0    omeprazole (PRILOSEC) 20 MG delayed-release capsule Take 1 Capsule by mouth every day. 90 Capsule 3    latanoprost (XALATAN) 0.005 % Solution Administer 1 Drop into affected eye(s).       No current Epic-ordered facility-administered medications on file.     Family History:    Family History   Problem Relation Age of Onset    Hyperlipidemia Mother     Hypertension Father     Diabetes Father       Personal/Social History:    Social History     Tobacco Use    Smoking status: Former     Types: Cigarettes     Quit date: 2013     Years since quitting: 10.1    Smokeless tobacco: Never   Vaping Use    Vaping Use: Never used   Substance Use Topics    Alcohol use: Not Currently    Drug use: Never     Social History     Social History Narrative    Not on file      Review of Systems:     General: Negative for fever/chills and unexpected weight change.    Respiratory:  Negative for cough and dyspnea.     Cardiovascular:  Negative for chest pain and palpitations.   Musculoskeletal:  Negative for myalgias.    Skin:  Negative for rash.      Objective  Physical Exam:   BP (!) 150/90 (BP Location: Left arm, Patient Position: Sitting, BP Cuff Size: Large adult)   Pulse 84   Temp 37.2 °C (99 °F) (Temporal)   Resp 20   Ht 1.753 m (5' 9\")   Wt (!) 143 " kg (315 lb)   SpO2 98%  Body mass index is 46.52 kg/m².  General:  Alert and oriented.  Well appearing.  NAD.  Head:  Normocephalic.   Neck: Supple without JVD. No lymphadenopathy.  Pulmonary:  Normal effort.  Clear to ausculation without rales, ronchi, or wheezing.  Cardiovascular:  Regular rate and rhythm without murmur, rubs or gallop.  Radial pulses are intact and equal bilaterally.  Musculoskeletal:  No extremity cyanosis, clubbing, or edema.  Skin:  Warm and dry.  No obvious lesions.  Psych: Normal mood and affect. Alert and oriented x3. Judgment and insight is normal.      Assessment/Plan   1. Morbid obesity with BMI of 45.0-49.9, adult (HCC)  Chronic and ongoing.  Educated on a healthy diet and exercise.    2. Primary hypertension  New diagnosis.  Discussed Losartan risks, benefits and side effects, he verbalized understanding.  Educated on a healthy diet and exercise.  Due for updated labs.  - losartan (COZAAR) 50 MG Tab; Take 1 Tablet by mouth every day.  Dispense: 90 Tablet; Refill: 0  - Comp Metabolic Panel; Future    3. Vitamin D deficiency  Chronic and ongoing.  Discussed taking a Vitamin D and Calcium supplement.  Due for updated labs.  - VITAMIN D,25 HYDROXY (DEFICIENCY); Future    4. Dyslipidemia  Chronic and ongoing.  Educated on a healthy diet and exercise.  Due for updated labs.  - Lipid Profile; Future    5. Gastroesophageal reflux disease, unspecified whether esophagitis present  Chronic and ongoing.  Continue to take Omeprazole 20 mg daily.  Educated on a healthy diet and exercise.  He is requesting a referral to GI, referral placed at this time.  - omeprazole (PRILOSEC) 20 MG delayed-release capsule; Take 1 Capsule by mouth every day.  Dispense: 90 Capsule; Refill: 3  - Referral to Gastroenterology    6. Family history of diabetes mellitus in father  Due for updated labs.  - HEMOGLOBIN A1C; Future    7. Need for hepatitis C screening test  Due for updated labs.  - HEP C VIRUS ANTIBODY;  Future    8. Screening for thyroid disorder  Due for updated labs.  - TSH WITH REFLEX TO FT4; Future    9. Screening for deficiency anemia  Due for updated labs.  - CBC WITHOUT DIFFERENTIAL; Future      Health Maintenance: Completed    Return in about 3 weeks (around 3/9/2023) for Medication F/U.    Discussed that the patient carries some responsibility in management of their health care.    Please note that this dictation was created using voice recognition software. I have made every reasonable attempt to correct obvious errors, but I expect that there are errors of grammar and possibly content that I did not discover before finalizing the note.    BOO Ye  [facility]

## 2023-02-16 NOTE — ASSESSMENT & PLAN NOTE
Chronic, ongoing.  Denies fatigue.  Denies any muscle weakness.  No history of CKD.  Reports having a good diet, including dairy products.  No history of IBD's, celiac, CF, or surgeries causing malabsorption.  Patient is obese.  Is not taking vitamin d or calcium supplement.      09/08/21 10:30   25-Hydroxy   Vitamin D 25 21 (L)

## 2023-02-17 DIAGNOSIS — I10 PRIMARY HYPERTENSION: ICD-10-CM

## 2023-02-17 DIAGNOSIS — K21.9 GASTROESOPHAGEAL REFLUX DISEASE, UNSPECIFIED WHETHER ESOPHAGITIS PRESENT: ICD-10-CM

## 2023-02-17 RX ORDER — OMEPRAZOLE 20 MG/1
20 CAPSULE, DELAYED RELEASE ORAL DAILY
Qty: 90 CAPSULE | Refills: 3 | Status: SHIPPED | OUTPATIENT
Start: 2023-02-17 | End: 2024-02-26

## 2023-02-17 RX ORDER — LOSARTAN POTASSIUM 50 MG/1
50 TABLET ORAL DAILY
Qty: 90 TABLET | Refills: 0 | Status: SHIPPED | OUTPATIENT
Start: 2023-02-17 | End: 2023-03-10 | Stop reason: SDUPTHER

## 2023-03-10 ENCOUNTER — OFFICE VISIT (OUTPATIENT)
Dept: MEDICAL GROUP | Facility: PHYSICIAN GROUP | Age: 38
End: 2023-03-10
Payer: COMMERCIAL

## 2023-03-10 VITALS
SYSTOLIC BLOOD PRESSURE: 132 MMHG | BODY MASS INDEX: 46.36 KG/M2 | HEIGHT: 69 IN | TEMPERATURE: 98 F | WEIGHT: 313 LBS | OXYGEN SATURATION: 97 % | RESPIRATION RATE: 20 BRPM | DIASTOLIC BLOOD PRESSURE: 80 MMHG | HEART RATE: 68 BPM

## 2023-03-10 DIAGNOSIS — E78.5 DYSLIPIDEMIA: ICD-10-CM

## 2023-03-10 DIAGNOSIS — I10 PRIMARY HYPERTENSION: ICD-10-CM

## 2023-03-10 DIAGNOSIS — E55.9 VITAMIN D DEFICIENCY: ICD-10-CM

## 2023-03-10 PROCEDURE — 99214 OFFICE O/P EST MOD 30 MIN: CPT | Performed by: NURSE PRACTITIONER

## 2023-03-10 RX ORDER — LOSARTAN POTASSIUM 50 MG/1
50 TABLET ORAL DAILY
Qty: 90 TABLET | Refills: 3 | Status: SHIPPED | OUTPATIENT
Start: 2023-03-10 | End: 2024-03-21

## 2023-03-10 RX ORDER — IBUPROFEN 800 MG/1
800 TABLET ORAL EVERY 8 HOURS PRN
Qty: 90 TABLET | Refills: 1 | Status: SHIPPED | OUTPATIENT
Start: 2023-03-10

## 2023-03-10 ASSESSMENT — FIBROSIS 4 INDEX: FIB4 SCORE: 0.64

## 2023-03-10 NOTE — ASSESSMENT & PLAN NOTE
Chronic, ongoing.  Denies fatigue.  Denies any muscle weakness.  No history of CKD.  Denies having a good diet, including dairy products.  No history of IBD's, celiac, CF, or surgeries causing malabsorption.  Patient is obese.  Is taking vitamin d and calcium supplement.      09/08/21 10:30 02/16/23 11:20   25-Hydroxy   Vitamin D 25 21 (L) 27 (L)

## 2023-03-10 NOTE — ASSESSMENT & PLAN NOTE
"Chronic, ongoing.  Not taking any cholesterol lowering medications.  Reports diet is \"not the best\".   He is not following a low-cholesterol diet.  He is not exercising regularly.  He is not taking ASA daily.  He denies dizziness, claudication, or chest pain.     09/08/21 10:30 02/16/23 11:20   Cholesterol,Tot 181 179   Triglycerides 89 106   HDL 35 ! 34 !    (H) 124 (H)     "

## 2023-03-10 NOTE — PROGRESS NOTES
"Subjective  Chief Complaint  Lab Results and Medication Follow Up    History of Present Illness  ADRY FONSECA is a 37 y.o. male. This established patient is here today to go over recent labs and follow up on Hypertension medications.    Primary hypertension  Currently taking Losartan 50 mg as directed.   Reports diet is \"not great\".   He is not following a low-salt diet.  He is not exercising regularly.  He is not taking baby aspirin daily.   He is not monitoring BP at home.   Denies symptoms low BP: light-headed, tunnel-vision, unusual fatigue, dizziness.  Denies symptoms high BP: pounding headache, visual changes, palpitations, flushed face.   Denies chest pain, shortness of breath, dyspnea on exertion.   Denies medicine side effects: unusual fatigue, slow heartbeat, foot/leg swelling, cough.    Dyslipidemia  Chronic, ongoing.  Not taking any cholesterol lowering medications.  Reports diet is \"not the best\".   He is not following a low-cholesterol diet.  He is not exercising regularly.  He is not taking ASA daily.  He denies dizziness, claudication, or chest pain.     09/08/21 10:30 02/16/23 11:20   Cholesterol,Tot 181 179   Triglycerides 89 106   HDL 35 ! 34 !    (H) 124 (H)       Vitamin D deficiency  Chronic, ongoing.  Denies fatigue.  Denies any muscle weakness.  No history of CKD.  Denies having a good diet, including dairy products.  No history of IBD's, celiac, CF, or surgeries causing malabsorption.  Patient is obese.  Is taking vitamin d and calcium supplement.      09/08/21 10:30 02/16/23 11:20   25-Hydroxy   Vitamin D 25 21 (L) 27 (L)     Past Medical History    Allergies: Erythromycin and Pcn [penicillins]  Past Medical History:   Diagnosis Date    Abdominal pain 9/8/2021    Groin pain, right 5/6/2022    Other chest pain 5/6/2022    Patient has intermittent chest pain He is followed by cardiology outpatient He has tachycardia in office today.  I discussed with the patient that he has " "had intermittent tachycardia, but he does not experience any symptoms of palpitations.  When I examined the patient I did not appreciate tachycardia.  Suspect this is paroxysmal in the setting of anxiety.  He says that he did wear a heart monitor    Primary hypertension 2/16/2023     Past Surgical History:   Procedure Laterality Date    EXC./BIOPSY MASS BACK      cyst removal on back     Current Outpatient Medications Ordered in Epic   Medication Sig Dispense Refill    ibuprofen (MOTRIN) 800 MG Tab Take 1 Tablet by mouth every 8 hours as needed for Moderate Pain. 90 Tablet 1    losartan (COZAAR) 50 MG Tab Take 1 Tablet by mouth every day. 90 Tablet 3    omeprazole (PRILOSEC) 20 MG delayed-release capsule Take 1 Capsule by mouth every day. 90 Capsule 3    latanoprost (XALATAN) 0.005 % Solution Administer 1 Drop into affected eye(s).       No current Epic-ordered facility-administered medications on file.     Family History:    Family History   Problem Relation Age of Onset    Hyperlipidemia Mother     Hypertension Father     Diabetes Father       Personal/Social History:    Social History     Tobacco Use    Smoking status: Former     Types: Cigarettes     Quit date: 2013     Years since quitting: 10.1    Smokeless tobacco: Never   Vaping Use    Vaping Use: Never used   Substance Use Topics    Alcohol use: Not Currently    Drug use: Never     Social History     Social History Narrative    Not on file      Review of Systems:   General: Negative for fever/chills and unexpected weight change.   Respiratory:  Negative for cough and dyspnea.    Cardiovascular:  Negative for chest pain and palpitations.  Musculoskeletal:  Negative for myalgias.   Skin:  Negative for rash.     Objective  Physical Exam:   /80 (BP Location: Left arm, Patient Position: Sitting, BP Cuff Size: Large adult)   Pulse 68   Temp 36.7 °C (98 °F) (Temporal)   Resp 20   Ht 1.753 m (5' 9\")   Wt (!) 142 kg (313 lb)   SpO2 97%  Body mass index " is 46.22 kg/m².  General:  Alert and oriented.  Well appearing.  NAD  Neck: Supple without JVD. No lymphadenopathy.  Pulmonary:  Normal effort.  Clear to ausculation without rales, ronchi, or wheezing.  Cardiovascular:  Regular rate and rhythm without murmur, rubs or gallop.   Skin:  Warm and dry.  No obvious lesions.  Musculoskeletal:  No extremity cyanosis, clubbing, or edema.      Assessment/Plan  1. Primary hypertension  Chronic and ongoing.  Continue to take Losartan 50 mg daily.  Educated on a healthy diet and exercise.  - losartan (COZAAR) 50 MG Tab; Take 1 Tablet by mouth every day.  Dispense: 90 Tablet; Refill: 3    2. Dyslipidemia  Chronic and ongoing.  Educated on a healthy diet and exercise.  Educated on taking an Omega 3 supplement.    3. Vitamin D deficiency  Chronic and ongoing.  Continue to take Vitamin D and Calcium supplement.      Health Maintenance: Completed    Return if symptoms worsen or fail to improve.    Discussed that the patient carries some responsibility in management of their health care.    Please note that this dictation was created using voice recognition software. I have made every reasonable attempt to correct obvious errors, but I expect that there are errors of grammar and possibly content that I did not discover before finalizing the note.    BOO Ye  [facility]

## 2023-03-10 NOTE — ASSESSMENT & PLAN NOTE
"Currently taking Losartan 50 mg as directed.   Reports diet is \"not great\".   He is not following a low-salt diet.  He is not exercising regularly.  He is not taking baby aspirin daily.   He is not monitoring BP at home.   Denies symptoms low BP: light-headed, tunnel-vision, unusual fatigue, dizziness.  Denies symptoms high BP: pounding headache, visual changes, palpitations, flushed face.   Denies chest pain, shortness of breath, dyspnea on exertion.   Denies medicine side effects: unusual fatigue, slow heartbeat, foot/leg swelling, cough.  "

## 2023-04-04 NOTE — PROGRESS NOTES
[facility]     PATIENT ID:  NAME:  Hayden Wilson  MRN:               [id number]  YOB: 1985    Date: 1:56 PM        Resident: Clayton Escamilla D.O.     CC:  Groin pain       HPI: Hayden Wilson is a 36 y.o. male who presented with       Groin pain   Patient reports experiencing right groin and testicular pain for approximately 1 month.  Symptoms started after he was lifting his dog.  He describes the pain as relatively constant, waxing and waning in intensity.  Pain is dull aching type pain.  Pain is worse when driving in the car and improved with cranberry drops.  Denies rash, discharge, or dysuria.      Chest pain   Patient has a history of intermittent chest pain.  He was seen in the emergency department and was recommended follow-up with cardiology.  He has seen his cardiologist, and has underwent heart monitoring, but has not been read.  Chest pain is believed to be related to GERD, and he is subsequently taking omeprazole daily.      Preventive screenings:     Cancer screenings:   Colon Cancer: N/A  Lung Cancer: N/A  Breast Cancer: N/A  BRCA testing: N/A  Cervical Cancer: N/A    Medical Screening:  Osteoporosis: N/A  AAA: N/A  DM: POC A1c today  Statin Use: Not on statin   STI: No concerns     PMH: Glaucoma, GERD  PSH: Pilonidal cyst   MEDs: Latanoprost, omeprazole   Allergies: Erythromycin/PCN- anaphylaxis     Social History:   Work: Not currently employed   Alcohol: None  Tobacco: 7 years since last use. 13 years pack per day.   Drugs: None   Relationship: Girlfriend. Not sexually active.     Obesity? Yes, BMI 43.55  Diet: Tries to eat vegetables and occasional fruit. Fast food tries to limit.    Exercise: Exercises with walking dog    REVIEW OF SYSTEMS:   See HPI.              PROBLEM LIST  Patient Active Problem List   Diagnosis   • Abdominal pain   • Glaucoma   • Vitamin D deficiency   • Dyslipidemia   • Chronic right shoulder pain        PAST SURGICAL HISTORY:  Past Surgical  "History:   Procedure Laterality Date   • EXC./BIOPSY MASS BACK      cyst removal on back       FAMILY HISTORY:  Family History   Problem Relation Age of Onset   • Hyperlipidemia Mother    • Hypertension Father    • Diabetes Father        SOCIAL HISTORY:   Social History     Tobacco Use   • Smoking status: Former Smoker     Types: Cigarettes     Quit date:      Years since quittin.3   • Smokeless tobacco: Never Used   Substance Use Topics   • Alcohol use: Not Currently       ALLERGIES:  Allergies   Allergen Reactions   • Erythromycin    • Pcn [Penicillins]        OUTPATIENT MEDICATIONS:    Current Outpatient Medications:   •  omeprazole (PRILOSEC) 20 MG delayed-release capsule, TAKE 1 CAPSULE BY MOUTH ONCE DAILY FOR 30 DAYS, Disp: , Rfl:   •  latanoprost (XALATAN) 0.005 % Solution, Administer 1 Drop into affected eye(s)., Disp: , Rfl:   •  lisinopril (PRINIVIL) 10 MG Tab, Take 10 mg by mouth every day. for 30 days (Patient not taking: Reported on 2022), Disp: , Rfl:   •  meloxicam (MOBIC) 15 MG tablet, Take 1 Tablet by mouth every day for 30 days. (Patient not taking: Reported on 2022), Disp: 30 Tablet, Rfl: 0  •  latanoprost (XALATAN) 0.005 % Solution, Administer 1 Drop into both eyes every evening., Disp: , Rfl:     PHYSICAL EXAM:  Vitals:    22 1353   BP: 132/84   BP Location: Left arm   Patient Position: Sitting   BP Cuff Size: Large adult   Pulse: (!) 113   Temp: 36.9 °C (98.5 °F)   TempSrc: Temporal   SpO2: 98%   Weight: (!) 136 kg (299 lb 3.2 oz)   Height: 1.765 m (5' 9.5\")       General: Pt resting in NAD, cooperative   Skin:  No cyanosis or jaundice   HEENT: NC/AT. EOMI. No conjunctival injection or sclera icterus.   Lungs:  CTAB, good air movement. Non-labored.   Cardiovascular:  S1/S2 RRR   Abdomen:  Abdomen is soft, non-tender, non-distended, +BS  : Chaperone: offered but did not want chaperone. Testes high riding equally bilaterally, but descended bilaterally. No erythema. No " right inguinal hernia.   Extremities:  No LE edema   CNS:  No gross focal neurologic deficits  Psych: Appropriate mood and affect       ASSESSMENT/PLAN:   36 y.o. male     Problem List Items Addressed This Visit     GERD (gastroesophageal reflux disease)     Patient has GERD   Patient is not having any alarming symptoms such as dysphagia or hematemesis   Continue omeprazole   Take omeprazole on empty stomach at least 30 minutes before first meal of the day  -Avoid spicy foods, chocolate, hot beverages, alcohol, or other irritating foods.   -Avoid consumption of food or liquids prior to falling asleep or lying flat.   -Avoid NSAIDs and steroids   Follow up as needed            Relevant Medications    omeprazole (PRILOSEC) 20 MG delayed-release capsule    Class 3 severe obesity without serious comorbidity with body mass index (BMI) of 40.0 to 44.9 in adult (HCC)     Obesity Class (III >40)  Discussed diet   Discussed exercise   Referral to nutrition  Referral to bariatric surgery offered, patient declined.                Relevant Orders    POCT  A1C (Completed)    Groin pain, right     Patient reports 1 month history of groin pain  -He does have bilateral high riding testes, but otherwise distended.  No erythema.  -Negative bilateral inguinal hernia  -Scrotal ultrasound and inguinal ultrasound ordered  -Urinalysis negative for infection  -Follow-up 4 to 6 weeks  -Return precautions discussed         Relevant Orders    [id number]    US-INGUINAL HERNIA    POCT Urinalysis (Completed)    Other chest pain          Clayton Escamilla D.O.  Family Medicine Resident PGY-2       Debridement Text: The wound edges were debrided prior to proceeding with the closure to facilitate wound healing.

## 2023-05-10 NOTE — PROGRESS NOTES
Subjective:     CC:   Chief Complaint   Patient presents with    [facility]    Back Pain     More in the middle side of the back on the right side is where the pain starts patient stated has been having this for 4 years. Patient stated that heat and massages help but not with the pain            HPI:   ADRY FONSECA is a 37-year-old male who presents to [facility]. The patient's past medical and surgical history, medications, allergies, and family history were reviewed.  The patient is up to date on labs, immunizations, and other preventative care.  The patient has hypertension for which she takes losartan 50 mg daily.  He reports his blood pressure is well controlled on his current regimen.  He has a slightly elevated cholesterol for which she manages through diet and exercise. The patient also reports chronic neck and lower back pain.  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  He reports he now has a deck stop and his neck has been bothering him.  He has no red flag symptoms present.      Past Medical History:   Diagnosis Date    Abdominal pain 9/8/2021    Groin pain, right 5/6/2022    Other chest pain 5/6/2022    Patient has intermittent chest pain He is followed by cardiology outpatient He has tachycardia in office today.  I discussed with the patient that he has had intermittent tachycardia, but he does not experience any symptoms of palpitations.  When I examined the patient I did not appreciate tachycardia.  Suspect this is paroxysmal in the setting of anxiety.  He says that he did wear a heart monitor    Primary hypertension 2/16/2023       Social History     Tobacco Use    Smoking status: Former     Types: Cigarettes     Quit date: 2013     Years since quitting: 10.4    Smokeless tobacco: Never   Vaping Use    Vaping Use: Never used   Substance Use Topics    Alcohol use: Not Currently    Drug use: Never       Current  "Outpatient Medications Ordered in Epic   Medication Sig Dispense Refill    ibuprofen (MOTRIN) 800 MG Tab Take 1 Tablet by mouth every 8 hours as needed for Moderate Pain. 90 Tablet 1    losartan (COZAAR) 50 MG Tab Take 1 Tablet by mouth every day. 90 Tablet 3    omeprazole (PRILOSEC) 20 MG delayed-release capsule Take 1 Capsule by mouth every day. 90 Capsule 3    latanoprost (XALATAN) 0.005 % Solution Administer 1 Drop into affected eye(s).       No current Epic-ordered facility-administered medications on file.       Allergies:  Erythromycin and Pcn [penicillins]    Health Maintenance: Completed    Review of Systems:  Constitutional: Negative for fever, chills.  Respiratory: Negative for cough and shortness of breath.    Cardiovascular: Negative for chest pain or palpitations.  Gastrointestinal: Negative for abdominal pain, nausea, vomiting, and diarrhea.   Neurological: Negative for headaches, numbness, or tingling.  Psychiatric: Negative for anxiety or depression.      Objective:       Exam:  /76   Pulse 80   Temp 37.3 °C (99.2 °F) (Temporal)   Ht 1.753 m (5' 9\")   Wt (!) 144 kg (317 lb)   SpO2 95%   BMI 46.81 kg/m²  Body mass index is 46.81 kg/m².    Constitutional: Well-developed, no acute distress.  HEENT: Pupils are equal, round, and reactive to light. Oropharynx is without erythema, edema or exudates.   Neck: No thyromegaly or palpable thyroid nodules. No cervical or supraclavicular lymphadenopathy noted.  Lungs: Clear to auscultation bilaterally. No wheezes, rhonchi, or rales.   Cardiovascular: Regular rate and rhythm, no murmurs noted.   Abdomen: Soft, nontender, nondistended.   Extremities: No edema or erythema.  Psychiatric:  Behavior, mood, and affect are appropriate.        Assessment & Plan:     37 y.o. male with the following -     Primary hypertension  Chronic condition, controlled.  The patient currently takes losartan 50 mg daily.  His blood pressure today's visit is 136/76.  He denies " new headaches, chest pain, shortness of breath, lower extremity edema.  -Continue current regimen of losartan 50 mg daily    Dyslipidemia  Chronic condition, stable.  Diet controlled.  Lipid panel on 2/16/2023 showed a total cholesterol 179, , age of 32, triglycerides 106. The 10-year ASCVD risk score (Gurdeep GUERRERO, et al., 2019) is: 2%  -Continue dietary management the low-cholesterol diet given the patient Catie and her ASCVD risk score    Neck pain  Chronic right-sided low back pain, unspecified whether sciatica present  Chronic medical conditions.  The patient reports chronic neck and lower back pain.  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  He reports he now has a deck stop and his neck has been bothering him.  He has no red flag symptoms present.  -We will obtain basic exercise to assess for bony abnormalities, recommended stretching exercises and ibuprofen as needed  - DX-CERVICAL SPINE-2 OR 3 VIEWS; Future  - DX-LUMBAR SPINE-2 OR 3 VIEWS; Future      Return in about 6 months (around 11/12/2023) for 6-month f/u visit.    Please note that this dictation was created using voice recognition software. I have made every reasonable attempt to correct obvious errors, but I expect that there are errors of grammar and possibly content that I did not discover before finalizing the note.

## 2023-05-12 ENCOUNTER — OFFICE VISIT (OUTPATIENT)
Dept: MEDICAL GROUP | Facility: PHYSICIAN GROUP | Age: 38
End: 2023-05-12
Payer: COMMERCIAL

## 2023-05-12 VITALS
OXYGEN SATURATION: 95 % | DIASTOLIC BLOOD PRESSURE: 76 MMHG | TEMPERATURE: 99.2 F | HEIGHT: 69 IN | SYSTOLIC BLOOD PRESSURE: 136 MMHG | WEIGHT: 315 LBS | BODY MASS INDEX: 46.65 KG/M2 | HEART RATE: 80 BPM

## 2023-05-12 DIAGNOSIS — G89.29 CHRONIC RIGHT-SIDED LOW BACK PAIN, UNSPECIFIED WHETHER SCIATICA PRESENT: ICD-10-CM

## 2023-05-12 DIAGNOSIS — M54.50 CHRONIC RIGHT-SIDED LOW BACK PAIN, UNSPECIFIED WHETHER SCIATICA PRESENT: ICD-10-CM

## 2023-05-12 DIAGNOSIS — M54.2 NECK PAIN: ICD-10-CM

## 2023-05-12 DIAGNOSIS — I10 PRIMARY HYPERTENSION: ICD-10-CM

## 2023-05-12 DIAGNOSIS — E78.5 DYSLIPIDEMIA: ICD-10-CM

## 2023-05-12 PROCEDURE — 99214 OFFICE O/P EST MOD 30 MIN: CPT | Performed by: INTERNAL MEDICINE

## 2023-05-12 PROCEDURE — 3078F DIAST BP <80 MM HG: CPT | Performed by: INTERNAL MEDICINE

## 2023-05-12 PROCEDURE — 3075F SYST BP GE 130 - 139MM HG: CPT | Performed by: INTERNAL MEDICINE

## 2023-05-12 ASSESSMENT — FIBROSIS 4 INDEX: FIB4 SCORE: 0.64

## 2023-08-11 ENCOUNTER — HOSPITAL ENCOUNTER (OUTPATIENT)
Dept: RADIOLOGY | Facility: MEDICAL CENTER | Age: 38
End: 2023-08-11
Attending: INTERNAL MEDICINE
Payer: COMMERCIAL

## 2023-08-11 DIAGNOSIS — G89.29 CHRONIC RIGHT-SIDED LOW BACK PAIN, UNSPECIFIED WHETHER SCIATICA PRESENT: ICD-10-CM

## 2023-08-11 DIAGNOSIS — M54.50 CHRONIC RIGHT-SIDED LOW BACK PAIN, UNSPECIFIED WHETHER SCIATICA PRESENT: ICD-10-CM

## 2023-08-11 DIAGNOSIS — M54.2 NECK PAIN: ICD-10-CM

## 2023-08-11 PROCEDURE — 72100 X-RAY EXAM L-S SPINE 2/3 VWS: CPT

## 2023-08-11 PROCEDURE — 72040 X-RAY EXAM NECK SPINE 2-3 VW: CPT

## 2023-10-28 NOTE — PROGRESS NOTES
Subjective:     CC:   Chief Complaint   Patient presents with    Tired     No matter how long he sleeps patient states he is still exhausted     Hip Pain     Right hip pain patient states that he was not doing anything it just started hurting not constantly also the pain goes down the right leg. When walking around is when the pain starts          HPI:   ADRY FONSECA is a 38-year-old male who presents for follow-up visit. The patient reports a 2-week history of right hip pain.  No preceding trauma that he is able to recall.  The pain is located on the lateral side of the hip joint.  He denies any groin pain.  He states it is painful to stand for prolonged periods of time, ambulate, and sleep on that side. The patient also reports development of fatigue over the last several months.  He states he sleeps a full 8 hours at night but still wakes up tired.  He denies any depressive symptoms.  He denies any chest pain or shortness of breath.  He has had a recent weight gain and does report snoring at night.      Past Medical History:   Diagnosis Date    Abdominal pain 2021    Groin pain, right 2022    Other chest pain 2022    Patient has intermittent chest pain He is followed by cardiology outpatient He has tachycardia in office today.  I discussed with the patient that he has had intermittent tachycardia, but he does not experience any symptoms of palpitations.  When I examined the patient I did not appreciate tachycardia.  Suspect this is paroxysmal in the setting of anxiety.  He says that he did wear a heart monitor    Primary hypertension 2023       Social History     Tobacco Use    Smoking status: Former     Current packs/day: 0.00     Types: Cigarettes     Quit date:      Years since quittin.0    Smokeless tobacco: Never   Vaping Use    Vaping Use: Never used   Substance Use Topics    Alcohol use: Not Currently    Drug use: Never       Current Outpatient Medications Ordered in Epic  "  Medication Sig Dispense Refill    ibuprofen (MOTRIN) 800 MG Tab Take 1 Tablet by mouth every 8 hours as needed for Moderate Pain. 90 Tablet 1    losartan (COZAAR) 50 MG Tab Take 1 Tablet by mouth every day. 90 Tablet 3    omeprazole (PRILOSEC) 20 MG delayed-release capsule Take 1 Capsule by mouth every day. 90 Capsule 3    latanoprost (XALATAN) 0.005 % Solution Administer 1 Drop into affected eye(s).      meloxicam (MOBIC) 7.5 MG Tab Take 1 Tablet by mouth every day. (Patient not taking: Reported on 11/1/2023)       No current [facility]-ordered facility-administered medications on file.       Allergies:  Erythromycin and Pcn [penicillins]    Health Maintenance: Completed    Review of Systems:  No fevers or chills. No cough, chest pain, or shortness of breath.       Objective:       Exam:  /74   Pulse 84   Temp 36.9 °C (98.4 °F) (Temporal)   Ht 1.753 m (5' 9\")   Wt (!) 148 kg (327 lb)   SpO2 94%   BMI 48.29 kg/m²  Body mass index is 48.29 kg/m².    Gen: Alert and oriented, No apparent distress.  Lungs: Normal effort, CTA bilaterally, no wheezes, rhonchi, or rales  CV: Regular rate and rhythm. No murmurs, rubs, or gallops.  Hip: Tenderness over trochanteric area, full range of motion but with pain      Assessment & Plan:     38 y.o. male with the following -     Trochanteric bursitis of right hip  Acute condition.  The patient reports a 2-week history of right hip pain.  No preceding trauma that he is able to recall.  The pain is located on the lateral side of the hip joint.  He denies any groin pain.  He states it is painful to sleep on that side.  Exam consistent with trochanteric bursitis.  Reviewed with the patient that this condition is very responsive to a corticosteroid injection that can be done by orthopedics.  Will place that referral today.  - Referral to Orthopedics    Chronic fatigue  Chronic condition, progressive.  The patient reports development of fatigue over the last several months.  He " states he sleeps a full 8 hours at night but still wakes up tired.  He denies any depressive symptoms.  He denies any chest pain or shortness of breath.  He has had a recent weight gain and does report snoring at night.  Will check basic fatigue levels and pending these results he may need a referral for sleep evaluation as given his history of snoring and recent weight gain.  Reviewed with the patient that undiagnosed sleep apnea can cause significant daytime fatigue.  The patient is agreeable to this plan.  - TESTOSTERONE BIO/SHBG MALE; Future  - TSH WITH REFLEX TO FT4; Future  - CBC WITH DIFFERENTIAL; Future  - Lipid Profile; Future  - VITAMIN B12; Future  - FERRITIN; Future  - IRON/TOTAL IRON BIND; Future      Return if symptoms worsen or fail to improve.    Please note that this dictation was created using voice recognition software. I have made every reasonable attempt to correct obvious errors, but I expect that there are errors of grammar and possibly content that I did not discover before finalizing the note.

## 2023-11-01 ENCOUNTER — OFFICE VISIT (OUTPATIENT)
Dept: MEDICAL GROUP | Facility: PHYSICIAN GROUP | Age: 38
End: 2023-11-01
Payer: COMMERCIAL

## 2023-11-01 ENCOUNTER — HOSPITAL ENCOUNTER (OUTPATIENT)
Dept: LAB | Facility: MEDICAL CENTER | Age: 38
End: 2023-11-01
Attending: INTERNAL MEDICINE
Payer: COMMERCIAL

## 2023-11-01 VITALS
WEIGHT: 315 LBS | HEIGHT: 69 IN | SYSTOLIC BLOOD PRESSURE: 132 MMHG | OXYGEN SATURATION: 94 % | TEMPERATURE: 98.4 F | BODY MASS INDEX: 46.65 KG/M2 | HEART RATE: 84 BPM | DIASTOLIC BLOOD PRESSURE: 74 MMHG

## 2023-11-01 DIAGNOSIS — M70.61 TROCHANTERIC BURSITIS OF RIGHT HIP: ICD-10-CM

## 2023-11-01 DIAGNOSIS — R53.82 CHRONIC FATIGUE: ICD-10-CM

## 2023-11-01 LAB
BASOPHILS # BLD AUTO: 0.6 % (ref 0–1.8)
BASOPHILS # BLD: 0.04 K/UL (ref 0–0.12)
CHOLEST SERPL-MCNC: 191 MG/DL (ref 100–199)
EOSINOPHIL # BLD AUTO: 0.08 K/UL (ref 0–0.51)
EOSINOPHIL NFR BLD: 1.2 % (ref 0–6.9)
ERYTHROCYTE [DISTWIDTH] IN BLOOD BY AUTOMATED COUNT: 43.6 FL (ref 35.9–50)
FASTING STATUS PATIENT QL REPORTED: NORMAL
FERRITIN SERPL-MCNC: 820 NG/ML (ref 22–322)
HCT VFR BLD AUTO: 44.4 % (ref 42–52)
HDLC SERPL-MCNC: 34 MG/DL
HGB BLD-MCNC: 15.2 G/DL (ref 14–18)
IMM GRANULOCYTES # BLD AUTO: 0.02 K/UL (ref 0–0.11)
IMM GRANULOCYTES NFR BLD AUTO: 0.3 % (ref 0–0.9)
IRON SATN MFR SERPL: 29 % (ref 15–55)
IRON SERPL-MCNC: 75 UG/DL (ref 50–180)
LDLC SERPL CALC-MCNC: 137 MG/DL
LYMPHOCYTES # BLD AUTO: 2.05 K/UL (ref 1–4.8)
LYMPHOCYTES NFR BLD: 31.7 % (ref 22–41)
MCH RBC QN AUTO: 30.8 PG (ref 27–33)
MCHC RBC AUTO-ENTMCNC: 34.2 G/DL (ref 32.3–36.5)
MCV RBC AUTO: 90.1 FL (ref 81.4–97.8)
MONOCYTES # BLD AUTO: 0.56 K/UL (ref 0–0.85)
MONOCYTES NFR BLD AUTO: 8.7 % (ref 0–13.4)
NEUTROPHILS # BLD AUTO: 3.72 K/UL (ref 1.82–7.42)
NEUTROPHILS NFR BLD: 57.5 % (ref 44–72)
NRBC # BLD AUTO: 0 K/UL
NRBC BLD-RTO: 0 /100 WBC (ref 0–0.2)
PLATELET # BLD AUTO: 314 K/UL (ref 164–446)
PMV BLD AUTO: 8.5 FL (ref 9–12.9)
RBC # BLD AUTO: 4.93 M/UL (ref 4.7–6.1)
TIBC SERPL-MCNC: 258 UG/DL (ref 250–450)
TRIGL SERPL-MCNC: 100 MG/DL (ref 0–149)
TSH SERPL DL<=0.005 MIU/L-ACNC: 0.84 UIU/ML (ref 0.38–5.33)
UIBC SERPL-MCNC: 183 UG/DL (ref 110–370)
VIT B12 SERPL-MCNC: 719 PG/ML (ref 211–911)
WBC # BLD AUTO: 6.5 K/UL (ref 4.8–10.8)

## 2023-11-01 PROCEDURE — 3075F SYST BP GE 130 - 139MM HG: CPT | Performed by: INTERNAL MEDICINE

## 2023-11-01 PROCEDURE — 84403 ASSAY OF TOTAL TESTOSTERONE: CPT

## 2023-11-01 PROCEDURE — 83540 ASSAY OF IRON: CPT

## 2023-11-01 PROCEDURE — 84270 ASSAY OF SEX HORMONE GLOBUL: CPT

## 2023-11-01 PROCEDURE — 3078F DIAST BP <80 MM HG: CPT | Performed by: INTERNAL MEDICINE

## 2023-11-01 PROCEDURE — 99214 OFFICE O/P EST MOD 30 MIN: CPT | Performed by: INTERNAL MEDICINE

## 2023-11-01 PROCEDURE — 82607 VITAMIN B-12: CPT

## 2023-11-01 PROCEDURE — 83550 IRON BINDING TEST: CPT

## 2023-11-01 PROCEDURE — 84402 ASSAY OF FREE TESTOSTERONE: CPT

## 2023-11-01 PROCEDURE — 80061 LIPID PANEL: CPT

## 2023-11-01 PROCEDURE — 36415 COLL VENOUS BLD VENIPUNCTURE: CPT

## 2023-11-01 PROCEDURE — 85025 COMPLETE CBC W/AUTO DIFF WBC: CPT

## 2023-11-01 PROCEDURE — 84443 ASSAY THYROID STIM HORMONE: CPT

## 2023-11-01 PROCEDURE — 82728 ASSAY OF FERRITIN: CPT

## 2023-11-01 RX ORDER — MELOXICAM 7.5 MG/1
1 TABLET ORAL
COMMUNITY
End: 2024-01-01

## 2023-11-01 RX ORDER — MELOXICAM 15 MG/1
1 TABLET ORAL DAILY
COMMUNITY
End: 2023-11-30

## 2023-11-01 RX ORDER — FAMOTIDINE 20 MG/1
1 TABLET, FILM COATED ORAL 2 TIMES DAILY
COMMUNITY
End: 2023-11-30

## 2023-11-01 ASSESSMENT — FIBROSIS 4 INDEX: FIB4 SCORE: 0.66

## 2023-11-03 LAB
SHBG SERPL-SCNC: 57 NMOL/L (ref 17–56)
TESTOST FREE MFR SERPL: 1.3 % (ref 1.6–2.9)
TESTOST FREE SERPL-MCNC: 45 PG/ML (ref 47–244)
TESTOST SERPL-MCNC: 345 NG/DL (ref 300–890)
TESTOSTERONE.FREE+WB SERPL-MCNC: 124 NG/DL (ref 131–682)

## 2023-11-12 DIAGNOSIS — R79.89 ELEVATED FERRITIN: ICD-10-CM

## 2023-11-13 ENCOUNTER — HOSPITAL ENCOUNTER (OUTPATIENT)
Dept: LAB | Facility: MEDICAL CENTER | Age: 38
End: 2023-11-13
Attending: INTERNAL MEDICINE
Payer: COMMERCIAL

## 2023-11-13 DIAGNOSIS — R79.89 ELEVATED FERRITIN: ICD-10-CM

## 2023-11-13 PROCEDURE — 86038 ANTINUCLEAR ANTIBODIES: CPT

## 2023-11-13 PROCEDURE — 81256 HFE GENE: CPT

## 2023-11-13 PROCEDURE — 86200 CCP ANTIBODY: CPT

## 2023-11-13 PROCEDURE — 86140 C-REACTIVE PROTEIN: CPT

## 2023-11-13 PROCEDURE — 36415 COLL VENOUS BLD VENIPUNCTURE: CPT

## 2023-11-13 PROCEDURE — 86431 RHEUMATOID FACTOR QUANT: CPT

## 2023-11-13 PROCEDURE — 85652 RBC SED RATE AUTOMATED: CPT

## 2023-11-14 LAB
CRP SERPL HS-MCNC: 0.94 MG/DL (ref 0–0.75)
ERYTHROCYTE [SEDIMENTATION RATE] IN BLOOD BY WESTERGREN METHOD: 16 MM/HOUR (ref 0–20)
RHEUMATOID FACT SER IA-ACNC: <10 IU/ML (ref 0–14)

## 2023-11-15 LAB
CCP IGA+IGG SERPL IA-ACNC: 5 UNITS (ref 0–19)
NUCLEAR IGG SER QL IA: NORMAL

## 2023-11-17 LAB
HFE GENE MUT ANL BLD/T: NORMAL
HFE P.C282Y BLD/T QL: NEGATIVE
HFE P.H63D BLD/T QL: NORMAL
HFE P.S65C BLD/T QL: NEGATIVE

## 2023-11-30 ENCOUNTER — APPOINTMENT (OUTPATIENT)
Dept: RADIOLOGY | Facility: IMAGING CENTER | Age: 38
End: 2023-11-30
Attending: ORTHOPAEDIC SURGERY
Payer: COMMERCIAL

## 2023-11-30 ENCOUNTER — OFFICE VISIT (OUTPATIENT)
Dept: SURGICAL ONCOLOGY | Facility: MEDICAL CENTER | Age: 38
End: 2023-11-30
Payer: COMMERCIAL

## 2023-11-30 VITALS
BODY MASS INDEX: 47.74 KG/M2 | HEIGHT: 68 IN | DIASTOLIC BLOOD PRESSURE: 78 MMHG | OXYGEN SATURATION: 97 % | HEART RATE: 107 BPM | SYSTOLIC BLOOD PRESSURE: 138 MMHG | WEIGHT: 315 LBS | TEMPERATURE: 98.7 F

## 2023-11-30 DIAGNOSIS — M70.61 TROCHANTERIC BURSITIS OF RIGHT HIP: ICD-10-CM

## 2023-11-30 DIAGNOSIS — M76.31 ILIOTIBIAL BAND SYNDROME OF RIGHT SIDE: ICD-10-CM

## 2023-11-30 PROCEDURE — 20610 DRAIN/INJ JOINT/BURSA W/O US: CPT | Mod: RT | Performed by: ORTHOPAEDIC SURGERY

## 2023-11-30 PROCEDURE — 73501 X-RAY EXAM HIP UNI 1 VIEW: CPT | Mod: TC,RT | Performed by: ORTHOPAEDIC SURGERY

## 2023-11-30 PROCEDURE — 3078F DIAST BP <80 MM HG: CPT | Performed by: ORTHOPAEDIC SURGERY

## 2023-11-30 PROCEDURE — 99203 OFFICE O/P NEW LOW 30 MIN: CPT | Mod: 25 | Performed by: ORTHOPAEDIC SURGERY

## 2023-11-30 PROCEDURE — 3075F SYST BP GE 130 - 139MM HG: CPT | Performed by: ORTHOPAEDIC SURGERY

## 2023-11-30 ASSESSMENT — ENCOUNTER SYMPTOMS
ABDOMINAL PAIN: 0
MYALGIAS: 1
SHORTNESS OF BREATH: 0
WEIGHT LOSS: 0
CHILLS: 0
WEAKNESS: 0
FEVER: 0
SENSORY CHANGE: 1

## 2023-11-30 ASSESSMENT — FIBROSIS 4 INDEX: FIB4 SCORE: 0.64

## 2023-11-30 NOTE — PROGRESS NOTES
Subjective:   11/30/2023  2:00 PM  Primary care physician:Jayashree Marrufo M.D.    Chief Complaint:   Right hip pain     History of presenting illness:  ADRY FONSECA  is a pleasant 38 y.o. male who was referred for evaluation of right hip pain.  He has been having pain for the last 6 months.  He localizes it to the greater trochanter and lateral thigh.  This is associated with some paresthesias in that same area, but no numbness or paresthesias past the knee.  He also denies any weakness in the right leg.  The pain is made worse with standing and walking for long periods of time.  It is made better with rest.  He denies any injury or trauma to the area.  He is not taking any medications for pain.  He has also had no therapy or injections for this problem.    Past Medical History:   Diagnosis Date    Abdominal pain 9/8/2021    Groin pain, right 5/6/2022    Other chest pain 5/6/2022    Patient has intermittent chest pain He is followed by cardiology outpatient He has tachycardia in office today.  I discussed with the patient that he has had intermittent tachycardia, but he does not experience any symptoms of palpitations.  When I examined the patient I did not appreciate tachycardia.  Suspect this is paroxysmal in the setting of anxiety.  He says that he did wear a heart monitor    Primary hypertension 2/16/2023     Past Surgical History:   Procedure Laterality Date    EXC./BIOPSY MASS BACK      cyst removal on back     Allergies   Allergen Reactions    Erythromycin     Pcn [Penicillins]      Outpatient Encounter Medications as of 11/30/2023   Medication Sig Dispense Refill    meloxicam (MOBIC) 7.5 MG Tab Take 1 Tablet by mouth every day. (Patient not taking: Reported on 11/1/2023)      [DISCONTINUED] diclofenac sodium (VOLTAREN) 1 % Gel  (Patient not taking: Reported on 11/1/2023)      [DISCONTINUED] famotidine (PEPCID) 20 MG Tab Take 1 Tablet by mouth 2 times a day. (Patient not taking: Reported on 11/1/2023)       [DISCONTINUED] meloxicam (MOBIC) 15 MG tablet Take 1 Tablet by mouth every day. (Patient not taking: Reported on 11/1/2023)      ibuprofen (MOTRIN) 800 MG Tab Take 1 Tablet by mouth every 8 hours as needed for Moderate Pain. 90 Tablet 1    losartan (COZAAR) 50 MG Tab Take 1 Tablet by mouth every day. 90 Tablet 3    omeprazole (PRILOSEC) 20 MG delayed-release capsule Take 1 Capsule by mouth every day. 90 Capsule 3    latanoprost (XALATAN) 0.005 % Solution Administer 1 Drop into affected eye(s).       No facility-administered encounter medications on file as of 11/30/2023.     Social History     Socioeconomic History    Marital status: Single     Spouse name: Not on file    Number of children: Not on file    Years of education: Not on file    Highest education level: Some college, no degree   Occupational History    Not on file   Tobacco Use    Smoking status: Former     Current packs/day: 0.00     Types: Cigarettes     Quit date: 2013     Years since quitting: 10.9    Smokeless tobacco: Never   Vaping Use    Vaping Use: Never used   Substance and Sexual Activity    Alcohol use: Not Currently    Drug use: Never    Sexual activity: Not Currently   Other Topics Concern    Not on file   Social History Narrative    Not on file     Social Determinants of Health     Financial Resource Strain: Low Risk  (5/1/2022)    Overall Financial Resource Strain (CARDIA)     Difficulty of Paying Living Expenses: Not very hard   Food Insecurity: No Food Insecurity (5/1/2022)    Hunger Vital Sign     Worried About Running Out of Food in the Last Year: Never true     Ran Out of Food in the Last Year: Never true   Transportation Needs: No Transportation Needs (5/1/2022)    PRAPARE - Transportation     Lack of Transportation (Medical): No     Lack of Transportation (Non-Medical): No   Physical Activity: Inactive (5/1/2022)    Exercise Vital Sign     Days of Exercise per Week: 1 day     Minutes of Exercise per Session: 0 min   Stress:  "Stress Concern Present (5/1/2022)    [place] [employer] - Occupational Stress Questionnaire     Feeling of Stress : To some extent   Social Connections: Moderately Isolated (5/1/2022)    Social Connection and Isolation Panel [NHANES]     Frequency of Communication with Friends and Family: More than three times a week     Frequency of Social Gatherings with Friends and Family: Once a week     Attends Rastafari Services: Never     Active Member of Clubs or Organizations: No     Attends Club or Organization Meetings: Never     Marital Status: Living with partner   Intimate Partner Violence: Not on file   Housing Stability: Low Risk  (5/1/2022)    Housing Stability Vital Sign     Unable to Pay for Housing in the Last Year: No     Number of Places Lived in the Last Year: 1     Unstable Housing in the Last Year: No      Social History     Tobacco Use   Smoking Status Former    Current packs/day: 0.00    Types: Cigarettes    Quit date: 2013    Years since quitting: 10.9   Smokeless Tobacco Never     Social History     Substance and Sexual Activity   Alcohol Use Not Currently     Social History     Substance and Sexual Activity   Drug Use Never        Family History   Problem Relation Age of Onset    Hyperlipidemia Mother     Hypertension Father     Diabetes Father        Review of Systems   Constitutional:  Negative for chills, fever and weight loss.   Respiratory:  Negative for shortness of breath.    Cardiovascular:  Negative for chest pain.   Gastrointestinal:  Negative for abdominal pain.   Musculoskeletal:  Positive for myalgias. Negative for joint pain.   Skin:  Negative for rash.   Neurological:  Positive for sensory change. Negative for weakness.        Objective:   /78 (BP Location: Right arm)   Pulse (!) 107   Temp 37.1 °C (98.7 °F) (Temporal)   Ht 1.727 m (5' 8\")   Wt (!) 149 kg (329 lb)   SpO2 97%   BMI 50.02 kg/m²     Physical Exam  Constitutional:       General: He is not in " acute distress.     Appearance: Normal appearance. He is obese.   HENT:      Head: Normocephalic and atraumatic.      Right Ear: External ear normal.      Left Ear: External ear normal.      Nose: Nose normal.      Mouth/Throat:      Mouth: Mucous membranes are moist.   Eyes:      Extraocular Movements: Extraocular movements intact.      Conjunctiva/sclera: Conjunctivae normal.   Cardiovascular:      Rate and Rhythm: Normal rate.      Pulses: Normal pulses.   Pulmonary:      Effort: Pulmonary effort is normal. No respiratory distress.      Breath sounds: No wheezing.   Abdominal:      General: Abdomen is flat. There is no distension.   Musculoskeletal:      Cervical back: Normal range of motion and neck supple.      Comments: right lower extremity: Overlying skin is without erythema, wounds or discoloration. Hip ROM is full and symmetric. No pain with IR/ER of hip.  SILT spn, dpn, plantar and sural nerves. Motor intact to knee extension, ankle dorsiflexion, ankle plantar flexion, and eversion. DP/PT pulse 2+.  There is tenderness with palpation over the greater trochanter and down the iliotibial band.  He has no tenderness with palpation of the knee joint line, Pez anserine or at Fany's tubercle. -Trendelenburg     Skin:     General: Skin is warm and dry.      Capillary Refill: Capillary refill takes less than 2 seconds.   Neurological:      Mental Status: He is alert and oriented to person, place, and time.   Psychiatric:         Mood and Affect: Mood normal.         Behavior: Behavior normal.           Imaging:  Per my read, AP pelvis and lateral of the right hip demonstrate no destructive osseous lesions, significant degenerative changes of the hip or soft tissue masses.  No fractures.  There are some calcifications adjacent to the left greater trochanter that appears chronic.      Diagnosis:     1. Iliotibial band syndrome of right side        2. Trochanteric bursitis of right hip                 Assessment/Plan:   I counseled the patient regarding the above diagnosis.  We discussed treatment options including anti-inflammatories, home stretching routine, physical therapy and corticosteroid injection.  He would like to try a corticosteroid injection and the home stretching routine.  I demonstrated the stretches for him today in clinic and instructed him to do this daily.  I counseled him that if his pain is not improving over the next couple of weeks to months to call in and I will place a referral to physical therapy.  He was in agreement with this plan and all questions were answered.  Please see separate note for injection.      Referrals: none  Restrictions: none, given home stretching program for iliotibial band syndrome  New medications/refills: none  Imaging studies: none  Follow-up: per Robertson,   Orthopedic Oncology and General Orthopedics

## 2023-11-30 NOTE — LETTER
November 30, 2023        Pt. Hrapreet Marrufo,    Thank you for referring Mr. Wilson over for evaluation of his hip pain. I am in agreement with you that his pain is secondary to trochanteric bursitis and iliotibial band syndrome. We discussed treatment options and I performed a corticosteroid injection today and he will start on a home stretching routine. I will refer him to physical therapy if he is still struggling in a month or so. Please call with any questions or concerns.     Sincerely,    Jonathan Robertson, DO  Orthopedic Oncology and Orthopedics   [facility]

## 2023-11-30 NOTE — PROCEDURES
Procedure: Right hip trochanteric bursa corticosteroid injection    Diagnosis: Right hip trochanteric bursitis    Consent: Verbal consent obtained from patient. We discussed risks of infection and pain.    Performed by: Jonathan Robertson DO    Procedure details:  A time out was held to confirm the site and patient to be injected. The site was prepped with alcohol and then a mixture of 4 cc of 1% lidocaine and 1 cc of 40 mg/ml of kenalog were injected into the bursa. He tolerated the procedure well and the site was covered with a band-aid.     Jonathan Robertson DO  Orthopedic Oncology and Orthopedics   [facility]

## 2024-01-01 PROBLEM — M22.42 CHONDROMALACIA OF LEFT PATELLA: Status: ACTIVE | Noted: 2022-10-14

## 2024-01-25 ENCOUNTER — APPOINTMENT (OUTPATIENT)
Dept: MEDICAL GROUP | Facility: PHYSICIAN GROUP | Age: 39
End: 2024-01-25
Payer: COMMERCIAL

## 2024-02-21 DIAGNOSIS — K21.9 GASTROESOPHAGEAL REFLUX DISEASE, UNSPECIFIED WHETHER ESOPHAGITIS PRESENT: ICD-10-CM

## 2024-02-22 NOTE — TELEPHONE ENCOUNTER
Received request via: Pharmacy    Was the patient seen in the last year in this department? Yes    Does the patient have an active prescription (recently filled or refills available) for medication(s) requested? No    Pharmacy Name: [facility]    Does the patient have FDC Plus and need 100 day supply (blood pressure, diabetes and cholesterol meds only)? Patient does not have SCP

## 2024-02-26 RX ORDER — OMEPRAZOLE 20 MG/1
20 CAPSULE, DELAYED RELEASE ORAL DAILY
Qty: 90 CAPSULE | Refills: 0 | Status: SHIPPED | OUTPATIENT
Start: 2024-02-26

## 2024-03-20 DIAGNOSIS — I10 PRIMARY HYPERTENSION: ICD-10-CM

## 2024-03-21 RX ORDER — LOSARTAN POTASSIUM 50 MG/1
50 TABLET ORAL DAILY
Qty: 90 TABLET | Refills: 0 | Status: SHIPPED | OUTPATIENT
Start: 2024-03-21

## 2024-03-21 NOTE — TELEPHONE ENCOUNTER
Received request via: Pharmacy    Was the patient seen in the last year in this department? Yes    Does the patient have an active prescription (recently filled or refills available) for medication(s) requested? No    [facility]    Does the patient have [facility] Plus and need 100 day supply (blood pressure, diabetes and cholesterol meds only)? Patient does not have SCP

## 2024-03-29 ENCOUNTER — OFFICE VISIT (OUTPATIENT)
Dept: URGENT CARE | Facility: PHYSICIAN GROUP | Age: 39
End: 2024-03-29
Payer: COMMERCIAL

## 2024-03-29 VITALS
HEART RATE: 85 BPM | SYSTOLIC BLOOD PRESSURE: 122 MMHG | HEIGHT: 69 IN | WEIGHT: 315 LBS | BODY MASS INDEX: 46.65 KG/M2 | OXYGEN SATURATION: 94 % | DIASTOLIC BLOOD PRESSURE: 82 MMHG | RESPIRATION RATE: 16 BRPM | TEMPERATURE: 97.3 F

## 2024-03-29 DIAGNOSIS — L08.9 SKIN INFECTION: ICD-10-CM

## 2024-03-29 ASSESSMENT — ENCOUNTER SYMPTOMS
CHILLS: 0
FEVER: 0
GASTROINTESTINAL NEGATIVE: 1

## 2024-03-29 ASSESSMENT — FIBROSIS 4 INDEX: FIB4 SCORE: 0.64

## 2024-03-29 NOTE — PROGRESS NOTES
Subjective     Harpreet FONSECA is a 38 y.o. male who presents with Cyst (Lower back, pt states there is a tear in the scar tissue )            Cyst  Pertinent negatives include no chills or fever.   States experiencing area of open skin in between buttocks.  States did have a pilonidal cyst removed years ago and states there was extensive treatment for this.  States he was worried when he got a yellow exudate from the site.  No noted fever, malaise or drainage but does bleed.  Bleeding is intermittent.  He is also experiencing significant pain to his right buttock.  States taking ibuprofen for this.  States he does sit for long periods of time at home on his computer for work.    PMH:  has a past medical history of Abdominal pain (9/8/2021), Groin pain, right (5/6/2022), Other chest pain (5/6/2022), and Primary hypertension (2/16/2023).    He has no past medical history of Asthma or Diabetes ([facility]).  MEDS:   Current Outpatient Medications:     mupirocin (BACTROBAN) 2 % Ointment, Apply 1 Application topically 2 times a day for 7 days., Disp: 60 g, Rfl: 0    losartan (COZAAR) 50 MG Tab, Take 1 tablet by mouth daily., Disp: 90 Tablet, Rfl: 0    omeprazole (PRILOSEC) 20 MG delayed-release capsule, Take 1 capsule by mouth daily., Disp: 90 Capsule, Rfl: 0    ibuprofen (MOTRIN) 800 MG Tab, Take 1 Tablet by mouth every 8 hours as needed for Moderate Pain., Disp: 90 Tablet, Rfl: 1    latanoprost (XALATAN) 0.005 % Solution, Administer 1 Drop into affected eye(s)., Disp: , Rfl:   ALLERGIES:   Allergies   Allergen Reactions    Erythromycin     Pcn [Penicillins]      SURGHX:   Past Surgical History:   Procedure Laterality Date    EXC./BIOPSY MASS BACK      cyst removal on back     SOCHX:  reports that he quit smoking about 11 years ago. His smoking use included cigarettes. He has never used smokeless tobacco. He reports that he does not currently use alcohol. He reports that he does not use drugs.  FH: Family history was  "reviewed, no pertinent findings to report      Review of Systems   Constitutional:  Negative for chills, fever and malaise/fatigue.   Gastrointestinal: Negative.    Genitourinary: Negative.    Skin:         Skin opening at buttock.   All other systems reviewed and are negative.             Objective     /82   Pulse 85   Temp 36.3 °C (97.3 °F)   Resp 16   Ht 1.753 m (5' 9\")   Wt (!) 148 kg (326 lb 4.5 oz)   SpO2 94%   BMI 48.18 kg/m²      Physical Exam  Vitals reviewed.   Constitutional:       General: He is awake. He is not in acute distress.     Appearance: Normal appearance. He is not ill-appearing, toxic-appearing or diaphoretic.   Cardiovascular:      Rate and Rhythm: Normal rate.   Pulmonary:      Effort: Pulmonary effort is normal.   Skin:     General: Skin is warm and dry.      Findings: Wound present. No abrasion, bruising, erythema, signs of injury, laceration, lesion or rash.             Comments: Single linear laceration/skin tear above anus opening between buttocks.  Mild erythema surrounding site without swelling, drainage or tenderness on palpation.  Skin is moist in nature at site.   Neurological:      Mental Status: He is alert and oriented to person, place, and time.   Psychiatric:         Attention and Perception: Attention normal.         Mood and Affect: Mood normal.         Speech: Speech normal.         Behavior: Behavior normal. Behavior is cooperative.                             Assessment & Plan        1. Skin infection    - mupirocin (BACTROBAN) 2 % Ointment; Apply 1 Application topically 2 times a day for 7 days.  Dispense: 60 g; Refill: 0     -Mild to skin infection secondary to skin tear to be treated with topical antibiotic, no need for I&D or oral antibiotics at this time  -Recommend to clean area with mild soap and water, dry thoroughly  -Recommend skin barrier to opening to protect skin from tearing further  -Recommend to avoid prolonged sitting while at " computer  -Monitor for increased size of skin tear, tunneling of skin, increased erythema, swelling,, purulent discharge, fever or malaise, pain- return to urgent care

## 2024-05-16 DIAGNOSIS — R79.89 ELEVATED FERRITIN: ICD-10-CM

## 2024-05-16 DIAGNOSIS — E78.5 DYSLIPIDEMIA: ICD-10-CM

## 2024-05-16 DIAGNOSIS — Z13.29 THYROID DISORDER SCREEN: ICD-10-CM

## 2024-05-16 DIAGNOSIS — Z13.1 ENCOUNTER FOR SCREENING FOR DIABETES MELLITUS: ICD-10-CM

## 2024-05-16 DIAGNOSIS — Z13.0 SCREENING FOR DEFICIENCY ANEMIA: ICD-10-CM

## 2024-05-22 ENCOUNTER — HOSPITAL ENCOUNTER (OUTPATIENT)
Dept: LAB | Facility: MEDICAL CENTER | Age: 39
End: 2024-05-22
Attending: INTERNAL MEDICINE
Payer: COMMERCIAL

## 2024-05-22 DIAGNOSIS — Z13.1 ENCOUNTER FOR SCREENING FOR DIABETES MELLITUS: ICD-10-CM

## 2024-05-22 DIAGNOSIS — Z13.29 THYROID DISORDER SCREEN: ICD-10-CM

## 2024-05-22 DIAGNOSIS — Z13.0 SCREENING FOR DEFICIENCY ANEMIA: ICD-10-CM

## 2024-05-22 DIAGNOSIS — R79.89 ELEVATED FERRITIN: ICD-10-CM

## 2024-05-22 DIAGNOSIS — E78.5 DYSLIPIDEMIA: ICD-10-CM

## 2024-05-22 LAB
ALBUMIN SERPL BCP-MCNC: 4.1 G/DL (ref 3.2–4.9)
ALBUMIN/GLOB SERPL: 1.3 G/DL
ALP SERPL-CCNC: 72 U/L (ref 30–99)
ALT SERPL-CCNC: 29 U/L (ref 2–50)
ANION GAP SERPL CALC-SCNC: 11 MMOL/L (ref 7–16)
AST SERPL-CCNC: 27 U/L (ref 12–45)
BASOPHILS # BLD AUTO: 0.7 % (ref 0–1.8)
BASOPHILS # BLD: 0.04 K/UL (ref 0–0.12)
BILIRUB SERPL-MCNC: 0.5 MG/DL (ref 0.1–1.5)
BUN SERPL-MCNC: 11 MG/DL (ref 8–22)
CALCIUM ALBUM COR SERPL-MCNC: 9 MG/DL (ref 8.5–10.5)
CALCIUM SERPL-MCNC: 9.1 MG/DL (ref 8.5–10.5)
CHLORIDE SERPL-SCNC: 106 MMOL/L (ref 96–112)
CHOLEST SERPL-MCNC: 161 MG/DL (ref 100–199)
CO2 SERPL-SCNC: 24 MMOL/L (ref 20–33)
CREAT SERPL-MCNC: 0.8 MG/DL (ref 0.5–1.4)
EOSINOPHIL # BLD AUTO: 0.08 K/UL (ref 0–0.51)
EOSINOPHIL NFR BLD: 1.4 % (ref 0–6.9)
ERYTHROCYTE [DISTWIDTH] IN BLOOD BY AUTOMATED COUNT: 46.2 FL (ref 35.9–50)
EST. AVERAGE GLUCOSE BLD GHB EST-MCNC: 97 MG/DL
FERRITIN SERPL-MCNC: 692 NG/ML (ref 22–322)
GFR SERPLBLD CREATININE-BSD FMLA CKD-EPI: 114 ML/MIN/1.73 M 2
GLOBULIN SER CALC-MCNC: 3.1 G/DL (ref 1.9–3.5)
GLUCOSE SERPL-MCNC: 98 MG/DL (ref 65–99)
HBA1C MFR BLD: 5 % (ref 4–5.6)
HCT VFR BLD AUTO: 45.5 % (ref 42–52)
HDLC SERPL-MCNC: 34 MG/DL
HGB BLD-MCNC: 15 G/DL (ref 14–18)
IMM GRANULOCYTES # BLD AUTO: 0.02 K/UL (ref 0–0.11)
IMM GRANULOCYTES NFR BLD AUTO: 0.3 % (ref 0–0.9)
IRON SATN MFR SERPL: 33 % (ref 15–55)
IRON SERPL-MCNC: 72 UG/DL (ref 50–180)
LDLC SERPL CALC-MCNC: 109 MG/DL
LYMPHOCYTES # BLD AUTO: 2.01 K/UL (ref 1–4.8)
LYMPHOCYTES NFR BLD: 34.4 % (ref 22–41)
MCH RBC QN AUTO: 30.1 PG (ref 27–33)
MCHC RBC AUTO-ENTMCNC: 33 G/DL (ref 32.3–36.5)
MCV RBC AUTO: 91.2 FL (ref 81.4–97.8)
MONOCYTES # BLD AUTO: 0.48 K/UL (ref 0–0.85)
MONOCYTES NFR BLD AUTO: 8.2 % (ref 0–13.4)
NEUTROPHILS # BLD AUTO: 3.22 K/UL (ref 1.82–7.42)
NEUTROPHILS NFR BLD: 55 % (ref 44–72)
NRBC # BLD AUTO: 0 K/UL
NRBC BLD-RTO: 0 /100 WBC (ref 0–0.2)
PLATELET # BLD AUTO: 296 K/UL (ref 164–446)
PMV BLD AUTO: 8.8 FL (ref 9–12.9)
POTASSIUM SERPL-SCNC: 3.9 MMOL/L (ref 3.6–5.5)
PROT SERPL-MCNC: 7.2 G/DL (ref 6–8.2)
RBC # BLD AUTO: 4.99 M/UL (ref 4.7–6.1)
SODIUM SERPL-SCNC: 141 MMOL/L (ref 135–145)
TIBC SERPL-MCNC: 220 UG/DL (ref 250–450)
TRIGL SERPL-MCNC: 88 MG/DL (ref 0–149)
TSH SERPL DL<=0.005 MIU/L-ACNC: 0.91 UIU/ML (ref 0.38–5.33)
UIBC SERPL-MCNC: 148 UG/DL (ref 110–370)
WBC # BLD AUTO: 5.9 K/UL (ref 4.8–10.8)

## 2024-05-28 NOTE — PROGRESS NOTES
We can open the window and a second baby verbal consent was acquired by the patient to use jaeyosot ambient listening note generation during this visit Yes     Subjective:     CC:   Chief Complaint   Patient presents with    Lab Results    Ear Fullness     Both ears get plugged up and flaky has tried over the [place] stuff to clean out ears but nothing helps. Patient states when he lays down he can not hear anything     Medication Refill         HPI:     History of Present Illness  ADRY FONSECA is a 39-year-old male who follow-up visit.  He is accompanied by his mother.The patient is seeking refills for his losartan, omeprazole, and ibuprofen prescriptions. The patient expresses concern regarding his ferritin level. He reports occasional sensation of ear plugging, and the sensation of his ear feeling hot.  He also reports hearing reduction when he is in the supine position.  His father had a stroke and he wonders if he should be on prophylactic aspirin.        Past Medical History:   Diagnosis Date    Abdominal pain 2021    Groin pain, right 2022    Other chest pain 2022    Patient has intermittent chest pain He is followed by cardiology outpatient He has tachycardia in office today.  I discussed with the patient that he has had intermittent tachycardia, but he does not experience any symptoms of palpitations.  When I examined the patient I did not appreciate tachycardia.  Suspect this is paroxysmal in the setting of anxiety.  He says that he did wear a heart monitor    Primary hypertension 2023       Social History     Tobacco Use    Smoking status: Former     Current packs/day: 0.00     Types: Cigarettes     Quit date: 2013     Years since quittin.4    Smokeless tobacco: Never   Vaping Use    Vaping status: Never Used   Substance Use Topics    Alcohol use: Not Currently    Drug use: Never       Current Outpatient Medications Ordered in Epic   Medication Sig Dispense Refill     "losartan (COZAAR) 50 MG Tab Take 1 Tablet by mouth every day. 90 Tablet 3    ibuprofen (MOTRIN) 800 MG Tab Take 1 Tablet by mouth every 8 hours as needed for Moderate Pain. 90 Tablet 3    omeprazole (PRILOSEC) 20 MG delayed-release capsule Take 1 Capsule by mouth every day. 90 Capsule 3    latanoprost (XALATAN) 0.005 % Solution Administer 1 Drop into affected eye(s).       No current Epic-ordered facility-administered medications on file.       Allergies:  Erythromycin and Pcn [penicillins]    Health Maintenance: Completed    Review of Systems:  No fevers or chills. No cough, chest pain, or shortness of breath.       Objective:       Exam:  /80   Pulse 82   Temp 36.6 °C (97.8 °F) (Temporal)   Ht 1.765 m (5' 9.5\")   Wt (!) 149 kg (328 lb)   SpO2 97%   BMI 47.74 kg/m²  Body mass index is 47.74 kg/m².    Gen: Alert and oriented, No apparent distress.  ENT: Bilateral cerumen impaction  Lungs: Normal effort, CTA bilaterally, no wheezes, rhonchi, or rales  CV: Regular rate and rhythm. No murmurs, rubs, or gallops.  Ext: No clubbing, cyanosis, edema.        Assessment & Plan:     39 y.o. male with the following -     Hereditary hemochromatosis (HCC)  Chronic condition, stable.  The patient is heterozygous for the H63D mutation.  His most recent ferritin level was 692, down from a previous value of 820.  His TSAT is 33%.  There is no indication for phlebotomy at this point.  - IRON/TOTAL IRON BIND; Future  - FERRITIN; Future    Primary hypertension  Chronic condition, controlled.  The patient currently takes losartan 50 mg daily.  His blood pressure today's visit is 124/80.  He denies new headaches, chest pain, shortness of breath, lower extremity edema.  He states he needs a refill of his losartan.  -Continue current regimen of losartan 50 mg daily  - losartan (COZAAR) 50 MG Tab; Take 1 Tablet by mouth every day.  Dispense: 90 Tablet; Refill: 3    Mixed hyperglyceridemia  Chronic condition, improved.  Currently " managed through dietary modification.  Most recent lipid panel on 5/22/2024 showed a total cholesterol of 161, LDL 1 9, HDL 34, triglycerides 88.  The 10-year ASCVD risk score (Gurdeep GUERRERO, et al., 2019) is: 1.6%  -Continue dietary management the low-cholesterol diet given the patient's low 10-year ASCVD risk score  - Comp Metabolic Panel; Future  - Lipid Profile; Future    Gastroesophageal reflux disease, unspecified whether esophagitis present  Chronic condition, controlled.  The patient currently takes omeprazole 20 mg daily and reports good symptom control on this regimen.  Patient requesting a refill of the medication at today's visit.  -Continue current regimen of omeprazole 20 mg daily  - omeprazole (PRILOSEC) 20 MG delayed-release capsule; Take 1 Capsule by mouth every day.  Dispense: 90 Capsule; Refill: 3    Bilateral impacted cerumen  Acute condition.  The patient reports ear plugging, the sensation of his ears feeling hot, and diminished hearing when he is in the supine position.  Exam notable for bilateral cerumen impaction.    Procedure: Cerumen Removal  Risks and benefits of procedure discussed with patient.  Cerumen removed with lavage.   Patient tolerated the procedure well.  Patientt educated about proper care of ear canal. Q-tip cleaning discouraged, use of debrox and warm water lavage discussed.  Post lavage curette performed by provider, Post procedure exam with clear canal and normal TM.     Screening for deficiency anemia  - CBC WITH DIFFERENTIAL; Future    Return in about 6 months (around 11/30/2024) for 6-month f/u visit.    Please note that this dictation was created using voice recognition software. I have made every reasonable attempt to correct obvious errors, but I expect that there are errors of grammar and possibly content that I did not discover before finalizing the note.

## 2024-05-30 ENCOUNTER — APPOINTMENT (OUTPATIENT)
Dept: MEDICAL GROUP | Facility: PHYSICIAN GROUP | Age: 39
End: 2024-05-30
Payer: COMMERCIAL

## 2024-05-30 VITALS
WEIGHT: 315 LBS | TEMPERATURE: 97.8 F | HEIGHT: 70 IN | DIASTOLIC BLOOD PRESSURE: 80 MMHG | OXYGEN SATURATION: 97 % | SYSTOLIC BLOOD PRESSURE: 124 MMHG | HEART RATE: 82 BPM | BODY MASS INDEX: 45.1 KG/M2

## 2024-05-30 DIAGNOSIS — E83.110 HEREDITARY HEMOCHROMATOSIS (HCC): ICD-10-CM

## 2024-05-30 DIAGNOSIS — I10 PRIMARY HYPERTENSION: ICD-10-CM

## 2024-05-30 DIAGNOSIS — Z13.0 SCREENING FOR DEFICIENCY ANEMIA: ICD-10-CM

## 2024-05-30 DIAGNOSIS — H61.23 BILATERAL IMPACTED CERUMEN: ICD-10-CM

## 2024-05-30 DIAGNOSIS — E78.3 MIXED HYPERGLYCERIDEMIA: ICD-10-CM

## 2024-05-30 DIAGNOSIS — K21.9 GASTROESOPHAGEAL REFLUX DISEASE, UNSPECIFIED WHETHER ESOPHAGITIS PRESENT: ICD-10-CM

## 2024-05-30 PROCEDURE — 69210 REMOVE IMPACTED EAR WAX UNI: CPT | Mod: 50 | Performed by: INTERNAL MEDICINE

## 2024-05-30 PROCEDURE — 3079F DIAST BP 80-89 MM HG: CPT | Performed by: INTERNAL MEDICINE

## 2024-05-30 PROCEDURE — 3074F SYST BP LT 130 MM HG: CPT | Performed by: INTERNAL MEDICINE

## 2024-05-30 PROCEDURE — 99214 OFFICE O/P EST MOD 30 MIN: CPT | Mod: 25 | Performed by: INTERNAL MEDICINE

## 2024-05-30 RX ORDER — IBUPROFEN 800 MG/1
800 TABLET ORAL EVERY 8 HOURS PRN
Qty: 90 TABLET | Refills: 3 | Status: SHIPPED | OUTPATIENT
Start: 2024-05-30

## 2024-05-30 RX ORDER — OMEPRAZOLE 20 MG/1
20 CAPSULE, DELAYED RELEASE ORAL DAILY
Qty: 90 CAPSULE | Refills: 3 | Status: SHIPPED | OUTPATIENT
Start: 2024-05-30

## 2024-05-30 RX ORDER — LOSARTAN POTASSIUM 50 MG/1
50 TABLET ORAL DAILY
Qty: 90 TABLET | Refills: 3 | Status: SHIPPED | OUTPATIENT
Start: 2024-05-30

## 2024-05-30 ASSESSMENT — FIBROSIS 4 INDEX: FIB4 SCORE: 0.69

## 2025-01-22 ENCOUNTER — OFFICE VISIT (OUTPATIENT)
Dept: URGENT CARE | Facility: PHYSICIAN GROUP | Age: 40
End: 2025-01-22
Payer: COMMERCIAL

## 2025-01-22 VITALS
BODY MASS INDEX: 45.1 KG/M2 | OXYGEN SATURATION: 97 % | SYSTOLIC BLOOD PRESSURE: 128 MMHG | TEMPERATURE: 98.6 F | RESPIRATION RATE: 18 BRPM | HEIGHT: 70 IN | WEIGHT: 315 LBS | DIASTOLIC BLOOD PRESSURE: 72 MMHG | HEART RATE: 90 BPM

## 2025-01-22 DIAGNOSIS — J32.9 RHINOSINUSITIS: ICD-10-CM

## 2025-01-22 PROCEDURE — 3074F SYST BP LT 130 MM HG: CPT | Performed by: FAMILY MEDICINE

## 2025-01-22 PROCEDURE — 99213 OFFICE O/P EST LOW 20 MIN: CPT | Performed by: FAMILY MEDICINE

## 2025-01-22 PROCEDURE — 3078F DIAST BP <80 MM HG: CPT | Performed by: FAMILY MEDICINE

## 2025-01-22 RX ORDER — CEFDINIR 300 MG/1
300 CAPSULE ORAL 2 TIMES DAILY
Qty: 14 CAPSULE | Refills: 0 | Status: SHIPPED | OUTPATIENT
Start: 2025-01-22 | End: 2025-01-29

## 2025-01-22 ASSESSMENT — ENCOUNTER SYMPTOMS
NAUSEA: 0
EYE REDNESS: 0
EYE DISCHARGE: 0
MYALGIAS: 0
VOMITING: 0
WEIGHT LOSS: 0

## 2025-01-22 ASSESSMENT — FIBROSIS 4 INDEX: FIB4 SCORE: 0.69

## 2025-01-22 NOTE — PROGRESS NOTES
"Subjective     Harpreet FONSECA is a 39 y.o. male who presents with Sinus Problem (C/o sinus pain and pressure, swelling on RT side of face, and headaches x1 day. Has been taking corsiten cold and flu. )            1 week sinus pressure. Nasal congestion. Intermittent post nasal drainage. Had dental filling to right upper molar 1 week ago. No fever. No trismus. No other aggravating or alleviating factors.          Review of Systems   Constitutional:  Negative for malaise/fatigue and weight loss.   Eyes:  Negative for discharge and redness.   Gastrointestinal:  Negative for nausea and vomiting.   Musculoskeletal:  Negative for joint pain and myalgias.   Skin:  Negative for itching and rash.              Objective     /72 (BP Location: Right arm, Patient Position: Sitting, BP Cuff Size: Large adult)   Pulse 90   Temp 37 °C (98.6 °F) (Temporal)   Resp 18   Ht 1.765 m (5' 9.5\") Comment: Pt reported  Wt (!) 149 kg (329 lb)   SpO2 97%   BMI 47.89 kg/m²      Physical Exam  Constitutional:       General: He is not in acute distress.     Appearance: He is well-developed.   HENT:      Head: Normocephalic and atraumatic.      Right Ear: Tympanic membrane normal.      Left Ear: Tympanic membrane normal.      Nose: Congestion present.      Comments: Tender over right maxillary sinus     Mouth/Throat:      Mouth: Mucous membranes are moist.      Pharynx: Oropharynx is clear.   Eyes:      Conjunctiva/sclera: Conjunctivae normal.   Cardiovascular:      Rate and Rhythm: Normal rate and regular rhythm.      Heart sounds: Normal heart sounds. No murmur heard.  Pulmonary:      Effort: Pulmonary effort is normal.      Breath sounds: Normal breath sounds. No wheezing.   Skin:     General: Skin is warm and dry.      Findings: No rash.   Neurological:      Mental Status: He is alert.                             Assessment & Plan        1. Rhinosinusitis  cefdinir (OMNICEF) 300 MG Cap        Nasal saline. Nasal " corticosteroid.    Differential diagnosis, natural history, supportive care, and indications for immediate follow-up were discussed.

## 2025-04-01 ENCOUNTER — OFFICE VISIT (OUTPATIENT)
Dept: URGENT CARE | Facility: PHYSICIAN GROUP | Age: 40
End: 2025-04-01
Payer: COMMERCIAL

## 2025-04-01 VITALS
TEMPERATURE: 98.2 F | RESPIRATION RATE: 18 BRPM | WEIGHT: 315 LBS | DIASTOLIC BLOOD PRESSURE: 74 MMHG | HEART RATE: 80 BPM | OXYGEN SATURATION: 98 % | BODY MASS INDEX: 46.65 KG/M2 | HEIGHT: 69 IN | SYSTOLIC BLOOD PRESSURE: 118 MMHG

## 2025-04-01 DIAGNOSIS — R29.898 LEFT ARM WEAKNESS: ICD-10-CM

## 2025-04-01 DIAGNOSIS — M54.9 UPPER BACK PAIN: ICD-10-CM

## 2025-04-01 PROCEDURE — 99214 OFFICE O/P EST MOD 30 MIN: CPT | Performed by: STUDENT IN AN ORGANIZED HEALTH CARE EDUCATION/TRAINING PROGRAM

## 2025-04-01 ASSESSMENT — ENCOUNTER SYMPTOMS
DIZZINESS: 0
CONSTIPATION: 0
FEVER: 0
HEADACHES: 0
DIARRHEA: 0
SHORTNESS OF BREATH: 0
BACK PAIN: 1
FOCAL WEAKNESS: 1
CHILLS: 0
COUGH: 0
ABDOMINAL PAIN: 0

## 2025-04-01 ASSESSMENT — FIBROSIS 4 INDEX: FIB4 SCORE: 0.69

## 2025-04-01 NOTE — PROGRESS NOTES
"Urgent Care Visit Note  [facility]    04/01/25    ADRY FONSECA     [address] [place] [address]     PCP: Pcp Pt States None     Subjective:     Chief Complaint   Patient presents with    Arm Pain     Acute L upper arm pain 1hr ago, began radiating down to fingertips, up to neck, upper L back. Hand still feels weak.       HPI:  ADRY FONSECA is a 39 y.o. male who presents for acute left upper arm pain 1 hour prior to arrival. Began to radiate into fingertips, up to neck and left upper back. Hand still feels weak. Primary area of pain is in left posterior shoulder. He reports the pain and symptoms were acute onset and felt like an \"explosion.\" Had nausea right when symptoms began as well.     No anterior chest pain. No shortness of breath. Otherwise has been at baseline recently. No recent illnesses.     ROS:  Review of Systems   Constitutional:  Negative for chills, fever and malaise/fatigue.   HENT:  Negative for congestion.    Respiratory:  Negative for cough and shortness of breath.    Cardiovascular:  Negative for chest pain.   Gastrointestinal:  Negative for abdominal pain, constipation and diarrhea.   Genitourinary:  Negative for dysuria, frequency and urgency.   Musculoskeletal:  Positive for back pain.   Neurological:  Positive for focal weakness. Negative for dizziness and headaches.        CURRENT MEDICATIONS:  Current Outpatient Medications   Medication Sig Refill Last Dispense    ibuprofen (MOTRIN) 800 MG Tab Take 1 Tablet by mouth every 8 hours as needed for Moderate Pain. 3 Unknown (outside pharmacy)    latanoprost (XALATAN) 0.005 % Solution Administer 1 Drop into affected eye(s).  Unknown (patient-reported)    losartan (COZAAR) 50 MG Tab Take 1 Tablet by mouth every day. 3 Unknown (outside pharmacy)    omeprazole (PRILOSEC) 20 MG delayed-release capsule Take 1 Capsule by mouth every day. 3 Unknown (outside pharmacy)       ALLERGIES:   Allergies   Allergen Reactions    " "Erythromycin     Pcn [Penicillins]        PROBLEM LIST:    does not have any pertinent problems on file.    Allergies, Medications, & Tobacco/Substance Use were reconciled by the Medical Assistant and reviewed by myself.     Objective:     /74 (BP Location: Left arm, Patient Position: Sitting, BP Cuff Size: Large adult)   Pulse 80   Temp 36.8 °C (98.2 °F) (Temporal)   Resp 18   Ht 1.753 m (5' 9\")   Wt (!) 145 kg (320 lb)   SpO2 98%   BMI 47.26 kg/m²     Physical Exam  Constitutional:       Appearance: Normal appearance.   HENT:      Head: Normocephalic and atraumatic.      Right Ear: External ear normal.      Left Ear: External ear normal.      Nose: Nose normal.   Eyes:      Extraocular Movements: Extraocular movements intact.      Pupils: Pupils are equal, round, and reactive to light.   Cardiovascular:      Rate and Rhythm: Normal rate and regular rhythm.      Pulses: Normal pulses.      Heart sounds: Normal heart sounds.   Pulmonary:      Effort: Pulmonary effort is normal. No respiratory distress.      Breath sounds: Normal breath sounds. No stridor. No wheezing, rhonchi or rales.   Abdominal:      General: Abdomen is flat.   Musculoskeletal:      Right lower leg: No edema.      Left lower leg: No edema.   Skin:     General: Skin is warm.      Capillary Refill: Capillary refill takes less than 2 seconds.   Neurological:      Mental Status: He is alert and oriented to person, place, and time.      Cranial Nerves: No cranial nerve deficit.      Sensory: No sensory deficit.      Coordination: Coordination normal.      Gait: Gait normal.      Comments: 4/5 strength in left hand  and elbow flexion. Otherwise 5/5 and symmetric in all extremity strength groups.     No sensory deficits. No CN deficits.    Psychiatric:         Mood and Affect: Mood normal.         Behavior: Behavior normal.           Lab Results/POC Test Results     EKG: NSR rate:  77 ,left anterior fascicle block, abnormal R wave " progression, no ST elevation or depression         Assessment/Plan:     Patient's history and physical exam consistent with:    Assessment & Plan  Left arm weakness         Upper back pain           - Difficult to ascertain etiology of symptoms in urgent care. Has risk factors for cardiac issues, aortic pathology, and stroke. Acute onset pain and weakness warrants more emergent testing including likely labs and imaging. Going to [facility] ER in [place].     I personally reviewed prior external notes and test results pertinent to today's visit. I have independently reviewed and interpreted all diagnostics ordered during this visit.    Please note that this dictation was created using voice recognition software. I have made a reasonable attempt to correct obvious errors, but I expect that there are errors of grammar and possibly content that I did not discover before finalizing the note.      This note was electronically signed by Arun Multani MD

## 2025-04-24 NOTE — Clinical Note
Member Name: ADRY FONSECA   Member Number: [id number]   Reference Number: [id number]   Approved Services: MRI/CAT Scan   Approved Service Dates: 04/22/2025 - 08/22/2025   Requesting Provider: Kwame Elias   Requested Provider: [facility]     Dear ADRY FONSECA:     The following medical service(s) requested by Kwame Elias have been approved:    Procedure Code Procedure Code Name Requested Quantity Approved Quantity Status   10490 (CPT®) CT CT SCAN,MAXILLOFACIAL AREA,W/O CONTRAST 1 1 Authorized       Approved Quantity means the number of visits approved for medication treatments and/or medical services.    The services should be provided by [facility] no later than 08/22/2025. Please contact the provider listed below with any questions.     Provider Information:  [facility]  [phone]    Your plan benefit may require a deductible, co-payment or coinsurance for these services. This authorization does not guarantee [facility] will pay the claim for services that you receive. Payment by [facility] for these services is subject to the terms of your Evidence of Coverage or Summary Plan Description, your eligibility at the time of service, and confirmation of benefit coverage.    For any questions or additional information, please contact Customer Service:    [facility]  Customer Service: [phone] or toll free [phone]  TTY users dial: [phone]   Call Center Hours: Mon - Fri 7 AM to 8 PM PST   Office Hours: Mon - Fri 8 AM to 5 PM [facility]   E-mail: [email or web address]   Website: [email or web address]       This information is available for free in other languages. Please contact Customer Service at the phone number above for more information. [facility] complies with applicable Federal civil rights laws and does not discriminate on the basis of race, color, national origin, age, disability or  sex.      Sincerely,     Healthcare Utilization Management Department     Cc: [facility]   Kwame Elias

## 2025-05-01 ENCOUNTER — HOSPITAL ENCOUNTER (OUTPATIENT)
Dept: RADIOLOGY | Facility: MEDICAL CENTER | Age: 40
End: 2025-05-01
Attending: DENTIST
Payer: COMMERCIAL

## 2025-05-01 DIAGNOSIS — M26.603 DISORDER OF BOTH TEMPOROMANDIBULAR JOINTS: ICD-10-CM

## 2025-05-01 DIAGNOSIS — M26.623 BILATERAL TEMPOROMANDIBULAR JOINT PAIN: ICD-10-CM

## 2025-05-01 PROCEDURE — 70486 CT MAXILLOFACIAL W/O DYE: CPT

## 2025-06-15 SDOH — ECONOMIC STABILITY: INCOME INSECURITY: HOW HARD IS IT FOR YOU TO PAY FOR THE VERY BASICS LIKE FOOD, HOUSING, MEDICAL CARE, AND HEATING?: NOT HARD AT ALL

## 2025-06-15 SDOH — ECONOMIC STABILITY: FOOD INSECURITY: WITHIN THE PAST 12 MONTHS, THE FOOD YOU BOUGHT JUST DIDN'T LAST AND YOU DIDN'T HAVE MONEY TO GET MORE.: NEVER TRUE

## 2025-06-15 SDOH — HEALTH STABILITY: PHYSICAL HEALTH: ON AVERAGE, HOW MANY DAYS PER WEEK DO YOU ENGAGE IN MODERATE TO STRENUOUS EXERCISE (LIKE A BRISK WALK)?: 0 DAYS

## 2025-06-15 SDOH — ECONOMIC STABILITY: INCOME INSECURITY: IN THE LAST 12 MONTHS, WAS THERE A TIME WHEN YOU WERE NOT ABLE TO PAY THE MORTGAGE OR RENT ON TIME?: NO

## 2025-06-15 SDOH — HEALTH STABILITY: PHYSICAL HEALTH: ON AVERAGE, HOW MANY MINUTES DO YOU ENGAGE IN EXERCISE AT THIS LEVEL?: 0 MIN

## 2025-06-15 SDOH — ECONOMIC STABILITY: FOOD INSECURITY: WITHIN THE PAST 12 MONTHS, YOU WORRIED THAT YOUR FOOD WOULD RUN OUT BEFORE YOU GOT MONEY TO BUY MORE.: NEVER TRUE

## 2025-06-15 SDOH — HEALTH STABILITY: MENTAL HEALTH
STRESS IS WHEN SOMEONE FEELS TENSE, NERVOUS, ANXIOUS, OR CAN'T SLEEP AT NIGHT BECAUSE THEIR MIND IS TROUBLED. HOW STRESSED ARE YOU?: NOT AT ALL

## 2025-06-15 ASSESSMENT — LIFESTYLE VARIABLES
HOW MANY STANDARD DRINKS CONTAINING ALCOHOL DO YOU HAVE ON A TYPICAL DAY: PATIENT DOES NOT DRINK
HOW OFTEN DO YOU HAVE A DRINK CONTAINING ALCOHOL: NEVER
SKIP TO QUESTIONS 9-10: 1
AUDIT-C TOTAL SCORE: 0
HOW OFTEN DO YOU HAVE SIX OR MORE DRINKS ON ONE OCCASION: NEVER

## 2025-06-15 ASSESSMENT — SOCIAL DETERMINANTS OF HEALTH (SDOH)
HOW OFTEN DO YOU GET TOGETHER WITH FRIENDS OR RELATIVES?: ONCE A WEEK
ARE YOU MARRIED, WIDOWED, DIVORCED, SEPARATED, NEVER MARRIED, OR LIVING WITH A PARTNER?: NEVER MARRIED
HOW OFTEN DO YOU ATTEND CHURCH OR RELIGIOUS SERVICES?: NEVER
HOW HARD IS IT FOR YOU TO PAY FOR THE VERY BASICS LIKE FOOD, HOUSING, MEDICAL CARE, AND HEATING?: NOT HARD AT ALL
HOW OFTEN DO YOU ATTENT MEETINGS OF THE CLUB OR ORGANIZATION YOU BELONG TO?: NEVER
DO YOU BELONG TO ANY CLUBS OR ORGANIZATIONS SUCH AS CHURCH GROUPS UNIONS, FRATERNAL OR ATHLETIC GROUPS, OR SCHOOL GROUPS?: NO
ARE YOU MARRIED, WIDOWED, DIVORCED, SEPARATED, NEVER MARRIED, OR LIVING WITH A PARTNER?: NEVER MARRIED
HOW OFTEN DO YOU GET TOGETHER WITH FRIENDS OR RELATIVES?: ONCE A WEEK
HOW OFTEN DO YOU HAVE SIX OR MORE DRINKS ON ONE OCCASION: NEVER
IN THE PAST 12 MONTHS, HAS THE ELECTRIC, GAS, OIL, OR WATER COMPANY THREATENED TO SHUT OFF SERVICE IN YOUR HOME?: NO
HOW MANY DRINKS CONTAINING ALCOHOL DO YOU HAVE ON A TYPICAL DAY WHEN YOU ARE DRINKING: PATIENT DOES NOT DRINK
HOW OFTEN DO YOU ATTENT MEETINGS OF THE CLUB OR ORGANIZATION YOU BELONG TO?: NEVER
DO YOU BELONG TO ANY CLUBS OR ORGANIZATIONS SUCH AS CHURCH GROUPS UNIONS, FRATERNAL OR ATHLETIC GROUPS, OR SCHOOL GROUPS?: NO
IN A TYPICAL WEEK, HOW MANY TIMES DO YOU TALK ON THE PHONE WITH FAMILY, FRIENDS, OR NEIGHBORS?: NEVER
HOW OFTEN DO YOU HAVE A DRINK CONTAINING ALCOHOL: NEVER
HOW OFTEN DO YOU ATTEND CHURCH OR RELIGIOUS SERVICES?: NEVER
WITHIN THE PAST 12 MONTHS, YOU WORRIED THAT YOUR FOOD WOULD RUN OUT BEFORE YOU GOT THE MONEY TO BUY MORE: NEVER TRUE
IN A TYPICAL WEEK, HOW MANY TIMES DO YOU TALK ON THE PHONE WITH FAMILY, FRIENDS, OR NEIGHBORS?: NEVER

## 2025-06-18 ENCOUNTER — HOSPITAL ENCOUNTER (OUTPATIENT)
Dept: LAB | Facility: MEDICAL CENTER | Age: 40
End: 2025-06-18
Payer: COMMERCIAL

## 2025-06-18 ENCOUNTER — OFFICE VISIT (OUTPATIENT)
Dept: MEDICAL GROUP | Facility: PHYSICIAN GROUP | Age: 40
End: 2025-06-18
Payer: COMMERCIAL

## 2025-06-18 VITALS
DIASTOLIC BLOOD PRESSURE: 82 MMHG | RESPIRATION RATE: 15 BRPM | TEMPERATURE: 98.7 F | HEIGHT: 70 IN | HEART RATE: 84 BPM | OXYGEN SATURATION: 97 % | WEIGHT: 315 LBS | SYSTOLIC BLOOD PRESSURE: 138 MMHG | BODY MASS INDEX: 45.1 KG/M2

## 2025-06-18 DIAGNOSIS — E78.5 DYSLIPIDEMIA: ICD-10-CM

## 2025-06-18 DIAGNOSIS — Z13.0 SCREENING FOR ENDOCRINE, METABOLIC AND IMMUNITY DISORDER: ICD-10-CM

## 2025-06-18 DIAGNOSIS — Z13.29 SCREENING FOR ENDOCRINE, METABOLIC AND IMMUNITY DISORDER: ICD-10-CM

## 2025-06-18 DIAGNOSIS — I10 PRIMARY HYPERTENSION: Primary | ICD-10-CM

## 2025-06-18 DIAGNOSIS — Z13.228 SCREENING FOR ENDOCRINE, METABOLIC AND IMMUNITY DISORDER: ICD-10-CM

## 2025-06-18 DIAGNOSIS — M54.41 CHRONIC RIGHT-SIDED LOW BACK PAIN WITH RIGHT-SIDED SCIATICA: ICD-10-CM

## 2025-06-18 DIAGNOSIS — G43.009 MIGRAINE WITHOUT AURA AND WITHOUT STATUS MIGRAINOSUS, NOT INTRACTABLE: ICD-10-CM

## 2025-06-18 DIAGNOSIS — G89.29 CHRONIC RIGHT-SIDED LOW BACK PAIN WITH RIGHT-SIDED SCIATICA: ICD-10-CM

## 2025-06-18 DIAGNOSIS — I10 PRIMARY HYPERTENSION: ICD-10-CM

## 2025-06-18 DIAGNOSIS — Z91.89 AT RISK FOR SLEEP APNEA: ICD-10-CM

## 2025-06-18 DIAGNOSIS — E55.9 VITAMIN D DEFICIENCY: ICD-10-CM

## 2025-06-18 DIAGNOSIS — K21.9 GASTROESOPHAGEAL REFLUX DISEASE, UNSPECIFIED WHETHER ESOPHAGITIS PRESENT: ICD-10-CM

## 2025-06-18 PROBLEM — M75.82 TENDINITIS OF LEFT ROTATOR CUFF: Status: ACTIVE | Noted: 2025-05-04

## 2025-06-18 PROBLEM — R06.83 SNORING: Status: ACTIVE | Noted: 2025-06-18

## 2025-06-18 LAB
25(OH)D3 SERPL-MCNC: 53 NG/ML (ref 30–100)
ALBUMIN SERPL BCP-MCNC: 4.2 G/DL (ref 3.2–4.9)
ALBUMIN/GLOB SERPL: 1.4 G/DL
ALP SERPL-CCNC: 77 U/L (ref 30–99)
ALT SERPL-CCNC: 26 U/L (ref 2–50)
ANION GAP SERPL CALC-SCNC: 13 MMOL/L (ref 7–16)
AST SERPL-CCNC: 29 U/L (ref 12–45)
BASOPHILS # BLD AUTO: 0.5 % (ref 0–1.8)
BASOPHILS # BLD: 0.03 K/UL (ref 0–0.12)
BILIRUB SERPL-MCNC: 0.6 MG/DL (ref 0.1–1.5)
BUN SERPL-MCNC: 14 MG/DL (ref 8–22)
CALCIUM ALBUM COR SERPL-MCNC: 9 MG/DL (ref 8.5–10.5)
CALCIUM SERPL-MCNC: 9.2 MG/DL (ref 8.5–10.5)
CHLORIDE SERPL-SCNC: 103 MMOL/L (ref 96–112)
CHOLEST SERPL-MCNC: 182 MG/DL (ref 100–199)
CO2 SERPL-SCNC: 24 MMOL/L (ref 20–33)
CREAT SERPL-MCNC: 0.79 MG/DL (ref 0.5–1.4)
EOSINOPHIL # BLD AUTO: 0.09 K/UL (ref 0–0.51)
EOSINOPHIL NFR BLD: 1.4 % (ref 0–6.9)
ERYTHROCYTE [DISTWIDTH] IN BLOOD BY AUTOMATED COUNT: 42.6 FL (ref 35.9–50)
EST. AVERAGE GLUCOSE BLD GHB EST-MCNC: 97 MG/DL
GFR SERPLBLD CREATININE-BSD FMLA CKD-EPI: 114 ML/MIN/1.73 M 2
GLOBULIN SER CALC-MCNC: 3.1 G/DL (ref 1.9–3.5)
GLUCOSE SERPL-MCNC: 93 MG/DL (ref 65–99)
HBA1C MFR BLD: 5 % (ref 4–5.6)
HCT VFR BLD AUTO: 43.2 % (ref 42–52)
HDLC SERPL-MCNC: 34 MG/DL
HGB BLD-MCNC: 14.7 G/DL (ref 14–18)
IMM GRANULOCYTES # BLD AUTO: 0.02 K/UL (ref 0–0.11)
IMM GRANULOCYTES NFR BLD AUTO: 0.3 % (ref 0–0.9)
LDLC SERPL CALC-MCNC: 127 MG/DL
LYMPHOCYTES # BLD AUTO: 1.73 K/UL (ref 1–4.8)
LYMPHOCYTES NFR BLD: 26.5 % (ref 22–41)
MCH RBC QN AUTO: 30.1 PG (ref 27–33)
MCHC RBC AUTO-ENTMCNC: 34 G/DL (ref 32.3–36.5)
MCV RBC AUTO: 88.5 FL (ref 81.4–97.8)
MONOCYTES # BLD AUTO: 0.67 K/UL (ref 0–0.85)
MONOCYTES NFR BLD AUTO: 10.3 % (ref 0–13.4)
NEUTROPHILS # BLD AUTO: 3.99 K/UL (ref 1.82–7.42)
NEUTROPHILS NFR BLD: 61 % (ref 44–72)
NRBC # BLD AUTO: 0 K/UL
NRBC BLD-RTO: 0 /100 WBC (ref 0–0.2)
PLATELET # BLD AUTO: 274 K/UL (ref 164–446)
PMV BLD AUTO: 8.6 FL (ref 9–12.9)
POTASSIUM SERPL-SCNC: 3.7 MMOL/L (ref 3.6–5.5)
PROT SERPL-MCNC: 7.3 G/DL (ref 6–8.2)
RBC # BLD AUTO: 4.88 M/UL (ref 4.7–6.1)
SODIUM SERPL-SCNC: 140 MMOL/L (ref 135–145)
TRIGL SERPL-MCNC: 104 MG/DL (ref 0–149)
TSH SERPL DL<=0.005 MIU/L-ACNC: 0.78 UIU/ML (ref 0.38–5.33)
WBC # BLD AUTO: 6.5 K/UL (ref 4.8–10.8)

## 2025-06-18 PROCEDURE — 80061 LIPID PANEL: CPT

## 2025-06-18 PROCEDURE — 85025 COMPLETE CBC W/AUTO DIFF WBC: CPT

## 2025-06-18 PROCEDURE — 3079F DIAST BP 80-89 MM HG: CPT

## 2025-06-18 PROCEDURE — 99214 OFFICE O/P EST MOD 30 MIN: CPT

## 2025-06-18 PROCEDURE — 36415 COLL VENOUS BLD VENIPUNCTURE: CPT

## 2025-06-18 PROCEDURE — 3075F SYST BP GE 130 - 139MM HG: CPT

## 2025-06-18 PROCEDURE — 80053 COMPREHEN METABOLIC PANEL: CPT

## 2025-06-18 PROCEDURE — 84443 ASSAY THYROID STIM HORMONE: CPT

## 2025-06-18 PROCEDURE — 83036 HEMOGLOBIN GLYCOSYLATED A1C: CPT

## 2025-06-18 PROCEDURE — 82306 VITAMIN D 25 HYDROXY: CPT

## 2025-06-18 RX ORDER — LOSARTAN POTASSIUM 50 MG/1
50 TABLET ORAL DAILY
Qty: 90 TABLET | Refills: 3 | Status: SHIPPED | OUTPATIENT
Start: 2025-06-18

## 2025-06-18 RX ORDER — IBUPROFEN 800 MG/1
800 TABLET, FILM COATED ORAL EVERY 8 HOURS PRN
Qty: 90 TABLET | Refills: 1 | Status: SHIPPED | OUTPATIENT
Start: 2025-06-18

## 2025-06-18 RX ORDER — OMEPRAZOLE 20 MG/1
20 CAPSULE, DELAYED RELEASE ORAL DAILY
Qty: 90 CAPSULE | Refills: 3 | Status: SHIPPED | OUTPATIENT
Start: 2025-06-18

## 2025-06-18 ASSESSMENT — PATIENT HEALTH QUESTIONNAIRE - PHQ9: CLINICAL INTERPRETATION OF PHQ2 SCORE: 0

## 2025-06-18 ASSESSMENT — FIBROSIS 4 INDEX: FIB4 SCORE: 0.71

## 2025-06-18 NOTE — PROGRESS NOTES
Subjective:     Chief Complaint   Patient presents with    [facility]     Back pain for years.   Refills on meds      History of Present Illness  ADRY FONSECA is a 40 y.o. male who presents to [facility]. His prior PCP was Jayashree Marrufo MD.    He has been experiencing left shoulder pain, which is being managed with physical therapy. A diagnosis of a torn rotator cuff was made, but surgical intervention was deemed inappropriate.    He reports a history of loud snoring and episodes of falling asleep during work hours, suggesting possible sleep apnea. He is scheduled for a sleep study on 06/26/2025. He was referred to Dr. Ness by his dentist due to persistent facial pain following dental procedures. Dr. Ness identified jaw misalignment and suspected bruxism during sleep, leading to a referral for a sleep study.    He has a history of back problems, including a previous injury at [facility] that resulted in swelling and required x-rays approximately 3 to 4 years ago. He did not file a worker's compensation claim. The pain radiates to the front of his body and down the side of his leg, but not into his buttock. He has a long-standing lump in the affected area, which has remained stable in size. He has been managing the pain with ibuprofen 800 mg once weekly and has not used muscle relaxants for this issue. He has previously taken Flexeril.    He has been on losartan 50 mg for approximately 6 to 7 years and requires a refill. He reports no chest pain or shortness of breath at rest, and no lightheadedness or dizziness.    He has been on omeprazole 20 mg daily for an extended period and has experienced flare-ups when attempting to discontinue it. He has not consulted a gastroenterologist for his acid reflux. He was previously diagnosed with a hiatal hernia and has had emergency room visits due to suspected heart attacks.    He occasionally experiences migraines, which are well-managed with rest and  "over-the-counter medications such as ibuprofen or Tylenol.    He has not received the hepatitis B vaccine. He had chickenpox as a child.    PAST SURGICAL HISTORY:  - Cyst removal on back    SOCIAL HISTORY  He is a former smoker, quit back in 2013.    FAMILY HISTORY  His paternal grandfather had lung cancer due to smoking. His father is starting to show signs of dementia and has diabetes. Both his parents have high blood pressure and high cholesterol. His paternal grandfather also had a stroke.  Allergies: Erythromycin and Pcn [penicillins]    Current Medications[1]     ROS per HPI  Health Maintenance: Completed    Objective:     /82   Pulse 84   Temp 37.1 °C (98.7 °F) (Temporal)   Resp 15   Ht 1.765 m (5' 9.5\")   Wt (!) 148 kg (325 lb 3.2 oz)   SpO2 97%  Body mass index is 47.34 kg/m².    Physical Exam  Vitals reviewed.   Constitutional:       General: He is not in acute distress.     Appearance: Normal appearance. He is not ill-appearing.   Cardiovascular:      Rate and Rhythm: Normal rate and regular rhythm.      Heart sounds: Normal heart sounds.   Pulmonary:      Effort: Pulmonary effort is normal.      Breath sounds: Normal breath sounds.   Abdominal:      General: Abdomen is flat.      Palpations: Abdomen is soft.   Musculoskeletal:         General: Normal range of motion.      Cervical back: Normal range of motion.      Lumbar back: Tenderness present. No swelling, deformity, lacerations, spasms or bony tenderness. Normal range of motion. Negative right straight leg raise test and negative left straight leg raise test. No scoliosis.   Skin:     General: Skin is warm and dry.   Neurological:      General: No focal deficit present.      Mental Status: He is alert.   Psychiatric:         Mood and Affect: Mood normal.         Behavior: Behavior normal.         Thought Content: Thought content normal.         Judgment: Judgment normal.        Results  Labs   - Lipid Panel: LDL cholesterol slightly " elevated, HDL cholesterol six points lower than desired.    Imaging   - Lumbar x-ray: No sign of misalignment, minor degenerative changes.    Results for orders placed or performed during the hospital encounter of 05/22/24   IRON/TOTAL IRON BIND    Collection Time: 05/22/24  9:04 AM   Result Value Ref Range    Iron 72 50 - 180 ug/dL    Total Iron Binding 220 (L) 250 - 450 ug/dL    Unsat Iron Binding 148 110 - 370 ug/dL    % Saturation 33 15 - 55 %   Lipid Profile    Collection Time: 05/22/24  9:04 AM   Result Value Ref Range    Cholesterol,Tot 161 100 - 199 mg/dL    Triglycerides 88 0 - 149 mg/dL    HDL 34 (A) >=40 mg/dL     (H) <100 mg/dL   FERRITIN    Collection Time: 05/22/24  9:04 AM   Result Value Ref Range    Ferritin 692.0 (H) 22.0 - 322.0 ng/mL   TSH WITH REFLEX TO FT4    Collection Time: 05/22/24  9:04 AM   Result Value Ref Range    TSH 0.909 0.380 - 5.330 uIU/mL   HEMOGLOBIN A1C    Collection Time: 05/22/24  9:04 AM   Result Value Ref Range    Glycohemoglobin 5.0 4.0 - 5.6 %    Est Avg Glucose 97 mg/dL   Comp Metabolic Panel    Collection Time: 05/22/24  9:04 AM   Result Value Ref Range    Sodium 141 135 - 145 mmol/L    Potassium 3.9 3.6 - 5.5 mmol/L    Chloride 106 96 - 112 mmol/L    Co2 24 20 - 33 mmol/L    Anion Gap 11.0 7.0 - 16.0    Glucose 98 65 - 99 mg/dL    Bun 11 8 - 22 mg/dL    Creatinine 0.80 0.50 - 1.40 mg/dL    Calcium 9.1 8.5 - 10.5 mg/dL    Correct Calcium 9.0 8.5 - 10.5 mg/dL    AST(SGOT) 27 12 - 45 U/L    ALT(SGPT) 29 2 - 50 U/L    Alkaline Phosphatase 72 30 - 99 U/L    Total Bilirubin 0.5 0.1 - 1.5 mg/dL    Albumin 4.1 3.2 - 4.9 g/dL    Total Protein 7.2 6.0 - 8.2 g/dL    Globulin 3.1 1.9 - 3.5 g/dL    A-G Ratio 1.3 g/dL   CBC WITH DIFFERENTIAL    Collection Time: 05/22/24  9:04 AM   Result Value Ref Range    WBC 5.9 4.8 - 10.8 K/uL    RBC 4.99 4.70 - 6.10 M/uL    Hemoglobin 15.0 14.0 - 18.0 g/dL    Hematocrit 45.5 42.0 - 52.0 %    MCV 91.2 81.4 - 97.8 fL    MCH 30.1 27.0 - 33.0 pg     MCHC 33.0 32.3 - 36.5 g/dL    RDW 46.2 35.9 - 50.0 fL    Platelet Count 296 164 - 446 K/uL    MPV 8.8 (L) 9.0 - 12.9 fL    Neutrophils-Polys 55.00 44.00 - 72.00 %    Lymphocytes 34.40 22.00 - 41.00 %    Monocytes 8.20 0.00 - 13.40 %    Eosinophils 1.40 0.00 - 6.90 %    Basophils 0.70 0.00 - 1.80 %    Immature Granulocytes 0.30 0.00 - 0.90 %    Nucleated RBC 0.00 0.00 - 0.20 /100 WBC    Neutrophils (Absolute) 3.22 1.82 - 7.42 K/uL    Lymphs (Absolute) 2.01 1.00 - 4.80 K/uL    Monos (Absolute) 0.48 0.00 - 0.85 K/uL    Eos (Absolute) 0.08 0.00 - 0.51 K/uL    Baso (Absolute) 0.04 0.00 - 0.12 K/uL    Immature Granulocytes (abs) 0.02 0.00 - 0.11 K/uL    NRBC (Absolute) 0.00 K/uL   ESTIMATED GFR    Collection Time: 05/22/24  9:04 AM   Result Value Ref Range    GFR (CKD-EPI) 114 >60 mL/min/1.73 m 2     Assessment & Plan:     The following plan was discussed through shared decision making with the patient:    1. Primary hypertension  Comp Metabolic Panel    losartan (COZAAR) 50 MG Tab      2. Chronic right-sided low back pain with right-sided sciatica  tizanidine (ZANAFLEX) 4 MG Tab    Referral to Physical Therapy      3. Gastroesophageal reflux disease, unspecified whether esophagitis present  CBC WITH DIFFERENTIAL    omeprazole (PRILOSEC) 20 MG delayed-release capsule      4. Dyslipidemia  Comp Metabolic Panel    Lipid Profile      5. Migraine without aura and without status migrainosus, not intractable        6. At risk for sleep apnea        7. Vitamin D deficiency  VITAMIN D,25 HYDROXY (DEFICIENCY)      8. Screening for endocrine, metabolic and immunity disorder  CBC WITH DIFFERENTIAL    Comp Metabolic Panel    HEMOGLOBIN A1C    TSH WITH REFLEX TO FT4        Assessment & Plan  1. Establishment of care.  - Comprehensive review of medical history conducted.  - Advised to inform sleep specialist that I am his primary care provider for record sharing.  - New panel of labs ordered to assess blood count, kidney and liver  function, screen for diabetes, evaluate cholesterol levels, vitamin D, and thyroid function.    2. Left shoulder pain.  - Currently undergoing physical therapy for left shoulder pain due to a torn rotator cuff at [facility].  - Pain and limited mobility noted.    3. Suspected sleep apnea.  - Snores loudly and has a family history of sleep apnea.  - Scheduled for a sleep apnea study on 06/26/2025.    4. Back pain.  - Reports chronic back pain radiating to the front of his body and down the side of his leg.  - Prescription for tizanidine 2 mg provided, with instructions to take half a tablet initially due to potential dizziness.  - Referral for physical therapy made.  - Referral to pain management specialist considered if no improvement with physical therapy and muscle relaxers.    5. Hypertension.  - On losartan 50 mg for several years.  - Refill for losartan 50 mg provided.    6. Gastroesophageal reflux disease (GERD).  - Taking omeprazole 20 mg daily for a long time.  - Advised to attempt weaning off omeprazole or reducing the dosage if possible.  - Referral to gastroenterologist will be made sooner if symptoms worsen while on medication.  - If symptoms remain stable on omeprazole 20 mg, continue until age 45, at which point colon cancer screening and an EGD will be discussed.    7. Migraines.  - Experiences occasional migraines, about once every two or three weeks, managed with rest and over-the-counter medications like ibuprofen or Tylenol.  - Further discussion necessary if migraines worsen.    Health maintenance.  - Advised to consider the hepatitis B vaccine series.  - Referral for physical therapy made.  Recommendations for healthy lifestyle include:  - Good rule of thumb for portions = size of the palm of your hand  - Eliminate or decrease the amount of all white carbohydrates such as white rice, white bread, white pasta, potatoes, etc.    - Focus on whole foods that are less processed foods and more of  plant/animal proteins; great examples are lean poultry or fish, vegetables, nuts, beans, and berries.   - Aim for at least 15-20 grams of fiber per day.    - Avoid drinking sugary beverages such as juice, soda, specialty coffee; these have very little nutritional value and are high in calories.   - Exercise: 150 minutes of moderate aerobic activity per week OR 75 minutes of vigorous aerobic activity per week + 2 days per week of strength    Return in about 2 months (around 8/18/2025) for Labs & Back Pain.       Please note that this dictation was created using voice recognition software. I have made every reasonable attempt to correct obvious errors, but I expect that there are errors of grammar and possibly content that I did not discover before finalizing the note.    BOO Driver  [facility]  [facility]       [1]   Current Outpatient Medications:     ibuprofen (MOTRIN) 800 MG Tab, Take 1 Tablet by mouth every 8 hours as needed for Moderate Pain., Disp: 90 Tablet, Rfl: 1    losartan (COZAAR) 50 MG Tab, Take 1 Tablet by mouth every day., Disp: 90 Tablet, Rfl: 3    omeprazole (PRILOSEC) 20 MG delayed-release capsule, Take 1 Capsule by mouth every day., Disp: 90 Capsule, Rfl: 3    tizanidine (ZANAFLEX) 4 MG Tab, Take 1 Tablet by mouth every 6 hours as needed (muscle spasm)., Disp: 90 Tablet, Rfl: 0    latanoprost (XALATAN) 0.005 % Solution, Administer 1 Drop into affected eye(s)., Disp: , Rfl:

## 2025-06-30 ENCOUNTER — RESULTS FOLLOW-UP (OUTPATIENT)
Dept: MEDICAL GROUP | Facility: PHYSICIAN GROUP | Age: 40
End: 2025-06-30

## 2025-07-28 ENCOUNTER — PATIENT MESSAGE (OUTPATIENT)
Dept: MEDICAL GROUP | Facility: PHYSICIAN GROUP | Age: 40
End: 2025-07-28
Payer: COMMERCIAL

## 2025-07-28 DIAGNOSIS — Z91.89 AT RISK FOR SLEEP APNEA: ICD-10-CM

## 2025-07-28 DIAGNOSIS — R06.83 SNORING: Primary | ICD-10-CM

## 2025-07-29 NOTE — Clinical Note
REFERRAL APPROVAL NOTICE         Sent on July 29, 2025                   Harpreet FONSECA  [address]  [place] [address]                   Dear Mr. FONSECA,    After a careful review of the medical information and benefit coverage, [facility] has processed your referral. See below for additional details.    If applicable, you must be actively enrolled with your insurance for coverage of the authorized service. If you have any questions regarding your coverage, please contact your insurance directly.    REFERRAL INFORMATION   Referral #:  [id number]  Referred-To Provider    Referred-By Provider:  Sleep Studies    JR Driver   [facility]      [address]  [place] [id number] [phone] [id number]  [place] [address]  [phone]    Referral Start Date:  07/29/2025  Referral End Date:   07/29/2026             SCHEDULING  If you do not already have an appointment, please call [phone] to make an appointment.     MORE INFORMATION  If you do not already have a [employer] account, sign up at: [email or web address]  You can access your medical information, make appointments, see lab results, billing information, and more.  If you have questions regarding this referral, please contact  the [facility] Referrals department at:             [phone]. Monday - Friday 8:00AM - 5:00PM.     Sincerely,    [facility]

## 2025-07-29 NOTE — Clinical Note
Member Name: ADRY FONSECA   Member Number: [id number]   Reference Number: [id number]   Approved Services: Consultation   Approved Service Dates: 07/29/2025 - 07/29/2026   Requesting Provider: Frances Jin   [facility]     Dear ADRY FONSECA:     The following medical service(s) requested by Frances Jin have been approved:    Procedure Code Procedure Code Name Requested Quantity Approved Quantity Status   14640 (CPT®) [place] OFFICE/OUTPATIENT NEW MODERATE MDM 45 MINUTES 1 1 Authorized   83571 (CPT®) [place] OFFICE/OUTPATIENT ESTABLISHED MOD MDM 30 MIN 5 5 Authorized       Approved Quantity means the number of visits approved for medication treatments and/or medical services.    The services should be provided by [facility] no later than 07/29/2026. Please contact the provider listed below with any questions. Your plan benefit may require a deductible, co-payment, or co-insurance for these services.    Provider Information:  [facility]  [phone]    Important Note for Members:    This authorization does not guarantee that [facility] or [facility] will pay for your care. Payment depends on your plan details, if you're eligible for coverage on the day you receive care, and whether the service follows plan rules. These include things such as reviewing if the service is medically necessary. We recommend reviewing your Evidence of Coverage before receiving any care.    Important Note for Providers:     Payment by [facility] or [facility] for these services is subject to the terms of the Evidence of Coverage or Summary Plan Description, eligibility at the time of service, standard processing procedures and confirmation of benefit coverage. All claims are subject to standard processing procedures, including but not limited to coding edits, medical necessity determinations, and other plan policies in effect at the time of claim  adjudication. Providers are required to follow all [facility] Administrative Guidelines and requirements.    For any questions or additional information, please contact Customer Service:    [facility]  Customer Service: [phone] or toll free [phone]  TTY users dial: [phone]   Call Center Hours: Mon - Fri 7 AM to 8 PM PST   Office Hours: Mon - Fri 8 AM to 5 PM PST   E-mail: [email or web address]   Website: [email or web address]       This information is available for free in other languages. Please contact Customer Service at the phone number above for more information. [facility] complies with applicable Federal civil rights laws and does not discriminate on the basis of race, color, national origin, age, disability or sex.      Sincerely,     Healthcare Utilization Management Department     Cc: [facility]   Frances Jin

## 2025-08-20 ENCOUNTER — OFFICE VISIT (OUTPATIENT)
Dept: MEDICAL GROUP | Facility: PHYSICIAN GROUP | Age: 40
End: 2025-08-20
Payer: COMMERCIAL

## 2025-08-20 VITALS
OXYGEN SATURATION: 98 % | BODY MASS INDEX: 46.65 KG/M2 | WEIGHT: 315 LBS | SYSTOLIC BLOOD PRESSURE: 128 MMHG | HEART RATE: 77 BPM | HEIGHT: 69 IN | RESPIRATION RATE: 18 BRPM | DIASTOLIC BLOOD PRESSURE: 80 MMHG | TEMPERATURE: 98.3 F

## 2025-08-20 DIAGNOSIS — M26.621 ARTHRALGIA OF RIGHT TEMPOROMANDIBULAR JOINT: Primary | ICD-10-CM

## 2025-08-20 DIAGNOSIS — E83.110 HEREDITARY HEMOCHROMATOSIS (HCC): ICD-10-CM

## 2025-08-20 DIAGNOSIS — M54.41 CHRONIC RIGHT-SIDED LOW BACK PAIN WITH RIGHT-SIDED SCIATICA: ICD-10-CM

## 2025-08-20 DIAGNOSIS — G89.29 CHRONIC RIGHT-SIDED LOW BACK PAIN WITH RIGHT-SIDED SCIATICA: ICD-10-CM

## 2025-08-20 PROCEDURE — 3079F DIAST BP 80-89 MM HG: CPT

## 2025-08-20 PROCEDURE — 3074F SYST BP LT 130 MM HG: CPT

## 2025-08-20 PROCEDURE — 99214 OFFICE O/P EST MOD 30 MIN: CPT

## 2025-08-20 RX ORDER — PREDNISONE 20 MG/1
20 TABLET ORAL DAILY
Qty: 5 TABLET | Refills: 0 | Status: SHIPPED | OUTPATIENT
Start: 2025-08-20 | End: 2025-08-25

## 2025-08-20 ASSESSMENT — FIBROSIS 4 INDEX: FIB4 SCORE: 0.87

## 2025-08-25 ENCOUNTER — HOSPITAL ENCOUNTER (OUTPATIENT)
Dept: LAB | Facility: MEDICAL CENTER | Age: 40
End: 2025-08-25
Payer: COMMERCIAL

## 2025-08-25 ENCOUNTER — HOSPITAL ENCOUNTER (OUTPATIENT)
Dept: RADIOLOGY | Facility: MEDICAL CENTER | Age: 40
End: 2025-08-25
Payer: COMMERCIAL

## 2025-08-25 DIAGNOSIS — G89.29 CHRONIC RIGHT-SIDED LOW BACK PAIN WITH RIGHT-SIDED SCIATICA: ICD-10-CM

## 2025-08-25 DIAGNOSIS — M54.41 CHRONIC RIGHT-SIDED LOW BACK PAIN WITH RIGHT-SIDED SCIATICA: ICD-10-CM

## 2025-08-25 DIAGNOSIS — E83.110 HEREDITARY HEMOCHROMATOSIS (HCC): ICD-10-CM

## 2025-08-25 LAB
FERRITIN SERPL-MCNC: 641 NG/ML (ref 22–322)
IRON SATN MFR SERPL: 28 % (ref 15–55)
IRON SERPL-MCNC: 67 UG/DL (ref 50–180)
TIBC SERPL-MCNC: 237 UG/DL (ref 250–450)
UIBC SERPL-MCNC: 170 UG/DL (ref 110–370)

## 2025-08-25 PROCEDURE — 82728 ASSAY OF FERRITIN: CPT

## 2025-08-25 PROCEDURE — 36415 COLL VENOUS BLD VENIPUNCTURE: CPT

## 2025-08-25 PROCEDURE — 83540 ASSAY OF IRON: CPT

## 2025-08-25 PROCEDURE — 72100 X-RAY EXAM L-S SPINE 2/3 VWS: CPT

## 2025-08-25 PROCEDURE — 83550 IRON BINDING TEST: CPT
